# Patient Record
Sex: MALE | Race: WHITE | NOT HISPANIC OR LATINO
[De-identification: names, ages, dates, MRNs, and addresses within clinical notes are randomized per-mention and may not be internally consistent; named-entity substitution may affect disease eponyms.]

---

## 2018-07-25 PROBLEM — Z00.00 ENCOUNTER FOR PREVENTIVE HEALTH EXAMINATION: Status: ACTIVE | Noted: 2018-07-25

## 2018-07-30 ENCOUNTER — OTHER (OUTPATIENT)
Age: 58
End: 2018-07-30

## 2018-07-30 DIAGNOSIS — M25.562 PAIN IN RIGHT KNEE: ICD-10-CM

## 2018-07-30 DIAGNOSIS — M25.561 PAIN IN RIGHT KNEE: ICD-10-CM

## 2018-07-31 ENCOUNTER — APPOINTMENT (OUTPATIENT)
Dept: ORTHOPEDIC SURGERY | Facility: CLINIC | Age: 58
End: 2018-07-31
Payer: COMMERCIAL

## 2018-07-31 VITALS
BODY MASS INDEX: 39.4 KG/M2 | TEMPERATURE: 98.5 F | DIASTOLIC BLOOD PRESSURE: 78 MMHG | HEART RATE: 73 BPM | SYSTOLIC BLOOD PRESSURE: 120 MMHG | HEIGHT: 68 IN | WEIGHT: 260 LBS

## 2018-07-31 DIAGNOSIS — M17.12 UNILATERAL PRIMARY OSTEOARTHRITIS, LEFT KNEE: ICD-10-CM

## 2018-07-31 DIAGNOSIS — Z86.79 PERSONAL HISTORY OF OTHER DISEASES OF THE CIRCULATORY SYSTEM: ICD-10-CM

## 2018-07-31 DIAGNOSIS — Z78.9 OTHER SPECIFIED HEALTH STATUS: ICD-10-CM

## 2018-07-31 DIAGNOSIS — M17.11 UNILATERAL PRIMARY OSTEOARTHRITIS, RIGHT KNEE: ICD-10-CM

## 2018-07-31 DIAGNOSIS — Z80.1 FAMILY HISTORY OF MALIGNANT NEOPLASM OF TRACHEA, BRONCHUS AND LUNG: ICD-10-CM

## 2018-07-31 PROCEDURE — 73564 X-RAY EXAM KNEE 4 OR MORE: CPT | Mod: 50

## 2018-07-31 PROCEDURE — 99205 OFFICE O/P NEW HI 60 MIN: CPT

## 2018-07-31 RX ORDER — HYDROCHLOROTHIAZIDE 12.5 MG/1
TABLET ORAL
Refills: 0 | Status: ACTIVE | COMMUNITY

## 2018-07-31 RX ORDER — IRBESARTAN 300 MG/1
TABLET, FILM COATED ORAL
Refills: 0 | Status: ACTIVE | COMMUNITY

## 2018-08-29 ENCOUNTER — RX RENEWAL (OUTPATIENT)
Age: 58
End: 2018-08-29

## 2018-09-10 ENCOUNTER — OUTPATIENT (OUTPATIENT)
Dept: OUTPATIENT SERVICES | Facility: HOSPITAL | Age: 58
LOS: 1 days | End: 2018-09-10
Payer: COMMERCIAL

## 2018-09-10 VITALS
HEART RATE: 84 BPM | TEMPERATURE: 99 F | HEIGHT: 67 IN | WEIGHT: 273.37 LBS | SYSTOLIC BLOOD PRESSURE: 136 MMHG | RESPIRATION RATE: 16 BRPM | DIASTOLIC BLOOD PRESSURE: 94 MMHG

## 2018-09-10 DIAGNOSIS — Z01.818 ENCOUNTER FOR OTHER PREPROCEDURAL EXAMINATION: ICD-10-CM

## 2018-09-10 DIAGNOSIS — I10 ESSENTIAL (PRIMARY) HYPERTENSION: ICD-10-CM

## 2018-09-10 DIAGNOSIS — M17.0 BILATERAL PRIMARY OSTEOARTHRITIS OF KNEE: ICD-10-CM

## 2018-09-10 DIAGNOSIS — Z29.9 ENCOUNTER FOR PROPHYLACTIC MEASURES, UNSPECIFIED: ICD-10-CM

## 2018-09-10 DIAGNOSIS — Z98.890 OTHER SPECIFIED POSTPROCEDURAL STATES: Chronic | ICD-10-CM

## 2018-09-10 LAB
ANION GAP SERPL CALC-SCNC: 17 MMOL/L — SIGNIFICANT CHANGE UP (ref 5–17)
APTT BLD: 30.3 SEC — SIGNIFICANT CHANGE UP (ref 27.5–37.4)
BLD GP AB SCN SERPL QL: SIGNIFICANT CHANGE UP
BUN SERPL-MCNC: 17 MG/DL — SIGNIFICANT CHANGE UP (ref 8–20)
CALCIUM SERPL-MCNC: 9.2 MG/DL — SIGNIFICANT CHANGE UP (ref 8.6–10.2)
CHLORIDE SERPL-SCNC: 98 MMOL/L — SIGNIFICANT CHANGE UP (ref 98–107)
CO2 SERPL-SCNC: 26 MMOL/L — SIGNIFICANT CHANGE UP (ref 22–29)
CREAT SERPL-MCNC: 1.14 MG/DL — SIGNIFICANT CHANGE UP (ref 0.5–1.3)
GLUCOSE SERPL-MCNC: 104 MG/DL — SIGNIFICANT CHANGE UP (ref 70–115)
HCT VFR BLD CALC: 45.6 % — SIGNIFICANT CHANGE UP (ref 42–52)
HGB BLD-MCNC: 15.5 G/DL — SIGNIFICANT CHANGE UP (ref 14–18)
INR BLD: 1.07 RATIO — SIGNIFICANT CHANGE UP (ref 0.88–1.16)
MCHC RBC-ENTMCNC: 28.9 PG — SIGNIFICANT CHANGE UP (ref 27–31)
MCHC RBC-ENTMCNC: 34 G/DL — SIGNIFICANT CHANGE UP (ref 32–36)
MCV RBC AUTO: 85.1 FL — SIGNIFICANT CHANGE UP (ref 80–94)
MRSA PCR RESULT.: SIGNIFICANT CHANGE UP
PLATELET # BLD AUTO: 221 K/UL — SIGNIFICANT CHANGE UP (ref 150–400)
POTASSIUM SERPL-MCNC: 3.9 MMOL/L — SIGNIFICANT CHANGE UP (ref 3.5–5.3)
POTASSIUM SERPL-SCNC: 3.9 MMOL/L — SIGNIFICANT CHANGE UP (ref 3.5–5.3)
PROTHROM AB SERPL-ACNC: 11.8 SEC — SIGNIFICANT CHANGE UP (ref 9.8–12.7)
RBC # BLD: 5.36 M/UL — SIGNIFICANT CHANGE UP (ref 4.6–6.2)
RBC # FLD: 13.2 % — SIGNIFICANT CHANGE UP (ref 11–15.6)
S AUREUS DNA NOSE QL NAA+PROBE: DETECTED
SODIUM SERPL-SCNC: 141 MMOL/L — SIGNIFICANT CHANGE UP (ref 135–145)
TYPE + AB SCN PNL BLD: SIGNIFICANT CHANGE UP
WBC # BLD: 10.8 K/UL — SIGNIFICANT CHANGE UP (ref 4.8–10.8)
WBC # FLD AUTO: 10.8 K/UL — SIGNIFICANT CHANGE UP (ref 4.8–10.8)

## 2018-09-10 PROCEDURE — 93010 ELECTROCARDIOGRAM REPORT: CPT

## 2018-09-10 PROCEDURE — 85610 PROTHROMBIN TIME: CPT

## 2018-09-10 PROCEDURE — 80048 BASIC METABOLIC PNL TOTAL CA: CPT

## 2018-09-10 PROCEDURE — 87641 MR-STAPH DNA AMP PROBE: CPT

## 2018-09-10 PROCEDURE — 87640 STAPH A DNA AMP PROBE: CPT

## 2018-09-10 PROCEDURE — G0463: CPT

## 2018-09-10 PROCEDURE — 86900 BLOOD TYPING SEROLOGIC ABO: CPT

## 2018-09-10 PROCEDURE — 85027 COMPLETE CBC AUTOMATED: CPT

## 2018-09-10 PROCEDURE — 36415 COLL VENOUS BLD VENIPUNCTURE: CPT

## 2018-09-10 PROCEDURE — 86850 RBC ANTIBODY SCREEN: CPT

## 2018-09-10 PROCEDURE — 93005 ELECTROCARDIOGRAM TRACING: CPT

## 2018-09-10 PROCEDURE — 85730 THROMBOPLASTIN TIME PARTIAL: CPT

## 2018-09-10 PROCEDURE — 86901 BLOOD TYPING SEROLOGIC RH(D): CPT

## 2018-09-10 RX ORDER — SODIUM CHLORIDE 9 MG/ML
3 INJECTION INTRAMUSCULAR; INTRAVENOUS; SUBCUTANEOUS EVERY 8 HOURS
Qty: 0 | Refills: 0 | Status: DISCONTINUED | OUTPATIENT
Start: 2018-10-19 | End: 2018-10-22

## 2018-09-10 NOTE — H&P PST ADULT - LAB RESULTS AND INTERPRETATION
9-11-17; [facility] informed and gave detailed instructions about the treatment, E- scribed meds to [facility] pharmacy. Dr. Lott's office informed.

## 2018-09-10 NOTE — H&P PST ADULT - HISTORY OF PRESENT ILLNESS
57 year old male 57 year old male who states that he had B/L knee pain for the last 17 years which progressively worsened.

## 2018-09-10 NOTE — H&P PST ADULT - NSANTHOSAYNRD_GEN_A_CORE
No. BENJIE screening performed.  STOP BANG Legend: 0-2 = LOW Risk; 3-4 = INTERMEDIATE Risk; 5-8 = HIGH Risk

## 2018-09-10 NOTE — PATIENT PROFILE ADULT. - FAMILY HISTORY
Father  Still living? No  Family history of lung cancer, Age at diagnosis: Age Unknown     Mother  Still living? No  Family history of thyroid disease, Age at diagnosis: Age Unknown

## 2018-09-10 NOTE — H&P PST ADULT - PROBLEM SELECTOR PLAN 3
Caprini Score 7 High risk,  Surgical team should assess /Strongly recommend pharmacological and mechanical measures for VTE prophylaxis

## 2018-09-10 NOTE — PATIENT PROFILE ADULT. - VISION (WITH CORRECTIVE LENSES IF THE PATIENT USUALLY WEARS THEM):
Partially impaired: cannot see medication labels or newsprint, but can see obstacles in path, and the surrounding layout; can count fingers at arm's length/glasses - reading

## 2018-09-10 NOTE — H&P PST ADULT - ASSESSMENT
medications reviewed, instructions given on what medications to take and what not to take. Asked the patient to take the Blood pressure medication/ heart medication on DOP.   CAPRINI SCORE [CLOT]    AGE RELATED RISK FACTORS                                                       MOBILITY RELATED FACTORS  [ x] Age 41-60 years                                            (1 Point)                  [ ] Bed rest                                                        (1 Point)  [ ] Age: 61-74 years                                           (2 Points)                 [ ] Plaster cast                                                   (2 Points)  [ ] Age= 75 years                                              (3 Points)                 [ ] Bed bound for more than 72 hours                 (2 Points)    DISEASE RELATED RISK FACTORS                                               GENDER SPECIFIC FACTORS  [ ] Edema in the lower extremities                       (1 Point)                  [ ] Pregnancy                                                     (1 Point)  [ ] Varicose veins                                               (1 Point)                  [ ] Post-partum < 6 weeks                                   (1 Point)             [x ] BMI > 25 Kg/m2                                            (1 Point)                  [ ] Hormonal therapy  or oral contraception          (1 Point)                 [ ] Sepsis (in the previous month)                        (1 Point)                  [ ] History of pregnancy complications                 (1 point)  [ ] Pneumonia or serious lung disease                                               [ ] Unexplained or recurrent                     (1 Point)           (in the previous month)                               (1 Point)  [ ] Abnormal pulmonary function test                     (1 Point)                 SURGERY RELATED RISK FACTORS  [ ] Acute myocardial infarction                              (1 Point)                 [ ]  Section                                             (1 Point)  [ ] Congestive heart failure (in the previous month)  (1 Point)               [ ] Minor surgery                                                  (1 Point)   [ ] Inflammatory bowel disease                             (1 Point)                 [ ] Arthroscopic surgery                                        (2 Points)  [ ] Central venous access                                      (2 Points)                [ ] General surgery lasting more than 45 minutes   (2 Points)       [ ] Stroke (in the previous month)                          (5 Points)               [x ] Elective arthroplasty                                         (5 Points)                                                                                                                                               HEMATOLOGY RELATED FACTORS                                                 TRAUMA RELATED RISK FACTORS  [ ] Prior episodes of VTE                                     (3 Points)                 [ ] Fracture of the hip, pelvis, or leg                       (5 Points)  [ ] Positive family history for VTE                         (3 Points)                 [ ] Acute spinal cord injury (in the previous month)  (5 Points)  [ ] Prothrombin 18632 A                                     (3 Points)                 [ ] Paralysis  (less than 1 month)                             (5 Points)  [ ] Factor V Leiden                                             (3 Points)                  [ ] Multiple Trauma within 1 month                        (5 Points)  [ ] Lupus anticoagulants                                     (3 Points)                                                           [ ] Anticardiolipin antibodies                               (3 Points)                                                       [ ] High homocysteine in the blood                      (3 Points)                                             [ ] Other congenital or acquired thrombophilia      (3 Points)                                                [ ] Heparin induced thrombocytopenia                  (3 Points)                                          Total Score [    7      ]

## 2018-09-10 NOTE — PATIENT PROFILE ADULT. - LEARNING ASSESSMENT (PATIENT) ADDITIONAL COMMENTS
Instructed pt on pre-op instructions, tips for safer surgery, pain management scale, pre-surgical infection prevention, MRSA/MSSA, Medical clearance - Dr Mao, take Ibersartan & HCTZ with a sip of water the morning of surgery, Joint Replacement Class, verbalized understanding of all instructions given.

## 2018-09-11 RX ORDER — MUPIROCIN 20 MG/G
1 OINTMENT TOPICAL
Qty: 1 | Refills: 0 | OUTPATIENT
Start: 2018-09-11 | End: 2018-09-15

## 2018-09-20 PROBLEM — I10 ESSENTIAL (PRIMARY) HYPERTENSION: Chronic | Status: ACTIVE | Noted: 2018-09-10

## 2018-09-24 NOTE — H&P PST ADULT - CONSTITUTIONAL
Clinic hours for Dr. Hernanedz:  Monday 8am - 5pm  Tuesday 9am - 6pm  Wednesday 8am - 12pm  Thursday 8am - 5pm  Friday  7am - 4pm  3rd Saturday of the month 8-12pm  Not in the Wednesday before that Saturday    If you need a refill on your prescription please call your pharmacy and let them know. Please be proactive and call before your medication runs out. The pharmacy will then contact us for the refill. Please allow 24-48 hours for the refill to be processed.     If your physician has ordered additional laboratory or radiology testing as part of your ongoing plan of care, please allow 5-7 business days from the day of your lab draw or test for the results to be sent and reviewed by your provider. If your results are critical and require more immediate intervention, you will be contacted sooner. Your results will be conveyed to you via a phone call or letter.    You may be receiving a patient satisfaction survey in the mail. Please take the time to complete, as your feedback is very important to us. We strive to make your experience exceptional and your comments help us with that goal. We look forward to hearing from you.  
detailed exam

## 2018-09-27 ENCOUNTER — RX RENEWAL (OUTPATIENT)
Age: 58
End: 2018-09-27

## 2018-10-01 ENCOUNTER — OUTPATIENT (OUTPATIENT)
Dept: OUTPATIENT SERVICES | Facility: HOSPITAL | Age: 58
LOS: 1 days | End: 2018-10-01
Payer: COMMERCIAL

## 2018-10-01 VITALS
WEIGHT: 271.17 LBS | HEART RATE: 68 BPM | HEIGHT: 68 IN | DIASTOLIC BLOOD PRESSURE: 87 MMHG | TEMPERATURE: 97 F | RESPIRATION RATE: 16 BRPM | SYSTOLIC BLOOD PRESSURE: 132 MMHG

## 2018-10-01 DIAGNOSIS — Z98.890 OTHER SPECIFIED POSTPROCEDURAL STATES: Chronic | ICD-10-CM

## 2018-10-01 DIAGNOSIS — Z01.818 ENCOUNTER FOR OTHER PREPROCEDURAL EXAMINATION: ICD-10-CM

## 2018-10-01 LAB
ANION GAP SERPL CALC-SCNC: 13 MMOL/L — SIGNIFICANT CHANGE UP (ref 5–17)
APTT BLD: 28.9 SEC — SIGNIFICANT CHANGE UP (ref 27.5–37.4)
BASOPHILS # BLD AUTO: 0 K/UL — SIGNIFICANT CHANGE UP (ref 0–0.2)
BASOPHILS NFR BLD AUTO: 0.3 % — SIGNIFICANT CHANGE UP (ref 0–2)
BLD GP AB SCN SERPL QL: SIGNIFICANT CHANGE UP
BUN SERPL-MCNC: 20 MG/DL — SIGNIFICANT CHANGE UP (ref 8–20)
CALCIUM SERPL-MCNC: 9.3 MG/DL — SIGNIFICANT CHANGE UP (ref 8.6–10.2)
CHLORIDE SERPL-SCNC: 101 MMOL/L — SIGNIFICANT CHANGE UP (ref 98–107)
CO2 SERPL-SCNC: 25 MMOL/L — SIGNIFICANT CHANGE UP (ref 22–29)
CREAT SERPL-MCNC: 1.12 MG/DL — SIGNIFICANT CHANGE UP (ref 0.5–1.3)
EOSINOPHIL # BLD AUTO: 0.2 K/UL — SIGNIFICANT CHANGE UP (ref 0–0.5)
EOSINOPHIL NFR BLD AUTO: 2.6 % — SIGNIFICANT CHANGE UP (ref 0–5)
GLUCOSE SERPL-MCNC: 94 MG/DL — SIGNIFICANT CHANGE UP (ref 70–115)
HCT VFR BLD CALC: 47.6 % — SIGNIFICANT CHANGE UP (ref 42–52)
HGB BLD-MCNC: 16.1 G/DL — SIGNIFICANT CHANGE UP (ref 14–18)
INR BLD: 1.04 RATIO — SIGNIFICANT CHANGE UP (ref 0.88–1.16)
LYMPHOCYTES # BLD AUTO: 1.7 K/UL — SIGNIFICANT CHANGE UP (ref 1–4.8)
LYMPHOCYTES # BLD AUTO: 18.2 % — LOW (ref 20–55)
MCHC RBC-ENTMCNC: 28.3 PG — SIGNIFICANT CHANGE UP (ref 27–31)
MCHC RBC-ENTMCNC: 33.8 G/DL — SIGNIFICANT CHANGE UP (ref 32–36)
MCV RBC AUTO: 83.8 FL — SIGNIFICANT CHANGE UP (ref 80–94)
MONOCYTES # BLD AUTO: 0.6 K/UL — SIGNIFICANT CHANGE UP (ref 0–0.8)
MONOCYTES NFR BLD AUTO: 6.4 % — SIGNIFICANT CHANGE UP (ref 3–10)
MRSA PCR RESULT.: SIGNIFICANT CHANGE UP
NEUTROPHILS # BLD AUTO: 6.6 K/UL — SIGNIFICANT CHANGE UP (ref 1.8–8)
NEUTROPHILS NFR BLD AUTO: 72.3 % — SIGNIFICANT CHANGE UP (ref 37–73)
PLATELET # BLD AUTO: 183 K/UL — SIGNIFICANT CHANGE UP (ref 150–400)
POTASSIUM SERPL-MCNC: 3.7 MMOL/L — SIGNIFICANT CHANGE UP (ref 3.5–5.3)
POTASSIUM SERPL-SCNC: 3.7 MMOL/L — SIGNIFICANT CHANGE UP (ref 3.5–5.3)
PROTHROM AB SERPL-ACNC: 11.5 SEC — SIGNIFICANT CHANGE UP (ref 9.8–12.7)
RBC # BLD: 5.68 M/UL — SIGNIFICANT CHANGE UP (ref 4.6–6.2)
RBC # FLD: 13 % — SIGNIFICANT CHANGE UP (ref 11–15.6)
S AUREUS DNA NOSE QL NAA+PROBE: DETECTED
SODIUM SERPL-SCNC: 139 MMOL/L — SIGNIFICANT CHANGE UP (ref 135–145)
TYPE + AB SCN PNL BLD: SIGNIFICANT CHANGE UP
WBC # BLD: 9.2 K/UL — SIGNIFICANT CHANGE UP (ref 4.8–10.8)
WBC # FLD AUTO: 9.2 K/UL — SIGNIFICANT CHANGE UP (ref 4.8–10.8)

## 2018-10-01 PROCEDURE — 86900 BLOOD TYPING SEROLOGIC ABO: CPT

## 2018-10-01 PROCEDURE — 86850 RBC ANTIBODY SCREEN: CPT

## 2018-10-01 PROCEDURE — 85730 THROMBOPLASTIN TIME PARTIAL: CPT

## 2018-10-01 PROCEDURE — 36415 COLL VENOUS BLD VENIPUNCTURE: CPT

## 2018-10-01 PROCEDURE — 87640 STAPH A DNA AMP PROBE: CPT

## 2018-10-01 PROCEDURE — 80048 BASIC METABOLIC PNL TOTAL CA: CPT

## 2018-10-01 PROCEDURE — 85027 COMPLETE CBC AUTOMATED: CPT

## 2018-10-01 PROCEDURE — G0463: CPT

## 2018-10-01 PROCEDURE — 85610 PROTHROMBIN TIME: CPT

## 2018-10-01 PROCEDURE — 86901 BLOOD TYPING SEROLOGIC RH(D): CPT

## 2018-10-01 RX ORDER — MUPIROCIN 20 MG/G
1 OINTMENT TOPICAL
Qty: 1 | Refills: 0 | OUTPATIENT
Start: 2018-10-01 | End: 2018-10-05

## 2018-10-01 RX ORDER — INFLUENZA VIRUS VACCINE 15; 15; 15; 15 UG/.5ML; UG/.5ML; UG/.5ML; UG/.5ML
0.5 SUSPENSION INTRAMUSCULAR ONCE
Qty: 0 | Refills: 0 | Status: DISCONTINUED | OUTPATIENT
Start: 2018-10-01 | End: 2018-10-16

## 2018-10-01 RX ORDER — SODIUM CHLORIDE 9 MG/ML
3 INJECTION INTRAMUSCULAR; INTRAVENOUS; SUBCUTANEOUS EVERY 8 HOURS
Qty: 0 | Refills: 0 | Status: DISCONTINUED | OUTPATIENT
Start: 2018-10-19 | End: 2018-10-22

## 2018-10-01 NOTE — H&P PST ADULT - HISTORY OF PRESENT ILLNESS
58 year old male present with c/o b/l knee pain 58 year old male present with c/o b/l knee pain x 17 years.   Patient is now schedule for b/l total knee replacements.

## 2018-10-01 NOTE — H&P PST ADULT - PROBLEM SELECTOR PLAN 2
Medical Clearance pending    continue medications as instructed. Asked the patient to take the Blood pressure medication/ heart medication on DOP.

## 2018-10-01 NOTE — H&P PST ADULT - MUSCULOSKELETAL
details… detailed exam normal strength/decreased ROM due to pain/no calf tenderness/no joint erythema/no joint warmth

## 2018-10-12 RX ORDER — TRANEXAMIC ACID 100 MG/ML
1250 INJECTION, SOLUTION INTRAVENOUS ONCE
Qty: 0 | Refills: 0 | Status: DISCONTINUED | OUTPATIENT
Start: 2018-10-19 | End: 2018-10-22

## 2018-10-18 ENCOUNTER — TRANSCRIPTION ENCOUNTER (OUTPATIENT)
Age: 58
End: 2018-10-18

## 2018-10-19 ENCOUNTER — TRANSCRIPTION ENCOUNTER (OUTPATIENT)
Age: 58
End: 2018-10-19

## 2018-10-19 ENCOUNTER — APPOINTMENT (OUTPATIENT)
Dept: ORTHOPEDIC SURGERY | Facility: HOSPITAL | Age: 58
End: 2018-10-19

## 2018-10-19 ENCOUNTER — INPATIENT (INPATIENT)
Facility: HOSPITAL | Age: 58
LOS: 2 days | Discharge: HOME | DRG: 462 | End: 2018-10-22
Attending: ORTHOPAEDIC SURGERY | Admitting: ORTHOPAEDIC SURGERY
Payer: COMMERCIAL

## 2018-10-19 VITALS
RESPIRATION RATE: 16 BRPM | DIASTOLIC BLOOD PRESSURE: 79 MMHG | HEART RATE: 92 BPM | OXYGEN SATURATION: 95 % | SYSTOLIC BLOOD PRESSURE: 152 MMHG | TEMPERATURE: 98 F | WEIGHT: 273.37 LBS | HEIGHT: 67 IN

## 2018-10-19 DIAGNOSIS — M17.0 BILATERAL PRIMARY OSTEOARTHRITIS OF KNEE: ICD-10-CM

## 2018-10-19 DIAGNOSIS — Z98.890 OTHER SPECIFIED POSTPROCEDURAL STATES: Chronic | ICD-10-CM

## 2018-10-19 LAB
GLUCOSE BLDC GLUCOMTR-MCNC: 105 MG/DL — HIGH (ref 70–99)
GLUCOSE BLDC GLUCOMTR-MCNC: 107 MG/DL — HIGH (ref 70–99)
GLUCOSE BLDC GLUCOMTR-MCNC: 135 MG/DL — HIGH (ref 70–99)

## 2018-10-19 PROCEDURE — 27447 TOTAL KNEE ARTHROPLASTY: CPT | Mod: AS,RT,LT

## 2018-10-19 PROCEDURE — 20985 CPTR-ASST DIR MS PX: CPT

## 2018-10-19 PROCEDURE — 27447 TOTAL KNEE ARTHROPLASTY: CPT | Mod: RT

## 2018-10-19 PROCEDURE — 73560 X-RAY EXAM OF KNEE 1 OR 2: CPT | Mod: 26,50

## 2018-10-19 RX ORDER — ACETAMINOPHEN 500 MG
1000 TABLET ORAL ONCE
Qty: 0 | Refills: 0 | Status: COMPLETED | OUTPATIENT
Start: 2018-10-19 | End: 2018-10-19

## 2018-10-19 RX ORDER — ONDANSETRON 8 MG/1
4 TABLET, FILM COATED ORAL ONCE
Qty: 0 | Refills: 0 | Status: DISCONTINUED | OUTPATIENT
Start: 2018-10-19 | End: 2018-10-19

## 2018-10-19 RX ORDER — VANCOMYCIN HCL 1 G
1500 VIAL (EA) INTRAVENOUS ONCE
Qty: 0 | Refills: 0 | Status: COMPLETED | OUTPATIENT
Start: 2018-10-19 | End: 2018-10-19

## 2018-10-19 RX ORDER — HYDROMORPHONE HYDROCHLORIDE 2 MG/ML
2 INJECTION INTRAMUSCULAR; INTRAVENOUS; SUBCUTANEOUS
Qty: 0 | Refills: 0 | Status: DISCONTINUED | OUTPATIENT
Start: 2018-10-19 | End: 2018-10-22

## 2018-10-19 RX ORDER — LOSARTAN POTASSIUM 100 MG/1
100 TABLET, FILM COATED ORAL DAILY
Qty: 0 | Refills: 0 | Status: DISCONTINUED | OUTPATIENT
Start: 2018-10-20 | End: 2018-10-22

## 2018-10-19 RX ORDER — POLYETHYLENE GLYCOL 3350 17 G/17G
17 POWDER, FOR SOLUTION ORAL DAILY
Qty: 0 | Refills: 0 | Status: DISCONTINUED | OUTPATIENT
Start: 2018-10-19 | End: 2018-10-22

## 2018-10-19 RX ORDER — HYDROMORPHONE HYDROCHLORIDE 2 MG/ML
0.5 INJECTION INTRAMUSCULAR; INTRAVENOUS; SUBCUTANEOUS
Qty: 0 | Refills: 0 | Status: DISCONTINUED | OUTPATIENT
Start: 2018-10-19 | End: 2018-10-22

## 2018-10-19 RX ORDER — CELECOXIB 200 MG/1
400 CAPSULE ORAL ONCE
Qty: 0 | Refills: 0 | Status: COMPLETED | OUTPATIENT
Start: 2018-10-19 | End: 2018-10-19

## 2018-10-19 RX ORDER — ACETAMINOPHEN 500 MG
975 TABLET ORAL EVERY 8 HOURS
Qty: 0 | Refills: 0 | Status: DISCONTINUED | OUTPATIENT
Start: 2018-10-19 | End: 2018-10-22

## 2018-10-19 RX ORDER — SODIUM CHLORIDE 9 MG/ML
1000 INJECTION, SOLUTION INTRAVENOUS
Qty: 0 | Refills: 0 | Status: DISCONTINUED | OUTPATIENT
Start: 2018-10-19 | End: 2018-10-19

## 2018-10-19 RX ORDER — OXYCODONE HYDROCHLORIDE 5 MG/1
5 TABLET ORAL
Qty: 0 | Refills: 0 | Status: DISCONTINUED | OUTPATIENT
Start: 2018-10-19 | End: 2018-10-22

## 2018-10-19 RX ORDER — HYDROCHLOROTHIAZIDE 25 MG
25 TABLET ORAL DAILY
Qty: 0 | Refills: 0 | Status: DISCONTINUED | OUTPATIENT
Start: 2018-10-20 | End: 2018-10-22

## 2018-10-19 RX ORDER — ACETAMINOPHEN 500 MG
1000 TABLET ORAL ONCE
Qty: 0 | Refills: 0 | Status: COMPLETED | OUTPATIENT
Start: 2018-10-19 | End: 2018-10-22

## 2018-10-19 RX ORDER — MAGNESIUM HYDROXIDE 400 MG/1
30 TABLET, CHEWABLE ORAL DAILY
Qty: 0 | Refills: 0 | Status: DISCONTINUED | OUTPATIENT
Start: 2018-10-19 | End: 2018-10-22

## 2018-10-19 RX ORDER — SODIUM CHLORIDE 9 MG/ML
1000 INJECTION, SOLUTION INTRAVENOUS
Qty: 0 | Refills: 0 | Status: DISCONTINUED | OUTPATIENT
Start: 2018-10-19 | End: 2018-10-21

## 2018-10-19 RX ORDER — ENOXAPARIN SODIUM 100 MG/ML
30 INJECTION SUBCUTANEOUS EVERY 12 HOURS
Qty: 0 | Refills: 0 | Status: DISCONTINUED | OUTPATIENT
Start: 2018-10-20 | End: 2018-10-22

## 2018-10-19 RX ORDER — GABAPENTIN 400 MG/1
300 CAPSULE ORAL ONCE
Qty: 0 | Refills: 0 | Status: COMPLETED | OUTPATIENT
Start: 2018-10-19 | End: 2018-10-19

## 2018-10-19 RX ORDER — SENNA PLUS 8.6 MG/1
2 TABLET ORAL AT BEDTIME
Qty: 0 | Refills: 0 | Status: DISCONTINUED | OUTPATIENT
Start: 2018-10-19 | End: 2018-10-22

## 2018-10-19 RX ORDER — CEFAZOLIN SODIUM 1 G
3000 VIAL (EA) INJECTION
Qty: 0 | Refills: 0 | Status: COMPLETED | OUTPATIENT
Start: 2018-10-19 | End: 2018-10-20

## 2018-10-19 RX ORDER — CEPHALEXIN 500 MG
500 CAPSULE ORAL
Qty: 0 | Refills: 0 | Status: DISCONTINUED | OUTPATIENT
Start: 2018-10-20 | End: 2018-10-22

## 2018-10-19 RX ORDER — OXYCODONE HYDROCHLORIDE 5 MG/1
20 TABLET ORAL ONCE
Qty: 0 | Refills: 0 | Status: DISCONTINUED | OUTPATIENT
Start: 2018-10-19 | End: 2018-10-19

## 2018-10-19 RX ORDER — MORPHINE SULFATE 50 MG/1
4 CAPSULE, EXTENDED RELEASE ORAL
Qty: 0 | Refills: 0 | Status: DISCONTINUED | OUTPATIENT
Start: 2018-10-19 | End: 2018-10-19

## 2018-10-19 RX ORDER — SCOPALAMINE 1 MG/3D
1 PATCH, EXTENDED RELEASE TRANSDERMAL ONCE
Qty: 0 | Refills: 0 | Status: COMPLETED | OUTPATIENT
Start: 2018-10-19 | End: 2018-10-19

## 2018-10-19 RX ORDER — OXYCODONE HYDROCHLORIDE 5 MG/1
10 TABLET ORAL
Qty: 0 | Refills: 0 | Status: DISCONTINUED | OUTPATIENT
Start: 2018-10-19 | End: 2018-10-22

## 2018-10-19 RX ORDER — KETOROLAC TROMETHAMINE 30 MG/ML
15 SYRINGE (ML) INJECTION ONCE
Qty: 0 | Refills: 0 | Status: DISCONTINUED | OUTPATIENT
Start: 2018-10-19 | End: 2018-10-21

## 2018-10-19 RX ORDER — IRBESARTAN 75 MG/1
1 TABLET ORAL
Qty: 0 | Refills: 0 | COMMUNITY

## 2018-10-19 RX ORDER — ONDANSETRON 8 MG/1
4 TABLET, FILM COATED ORAL EVERY 6 HOURS
Qty: 0 | Refills: 0 | Status: DISCONTINUED | OUTPATIENT
Start: 2018-10-19 | End: 2018-10-22

## 2018-10-19 RX ORDER — CEFAZOLIN SODIUM 1 G
3000 VIAL (EA) INJECTION ONCE
Qty: 0 | Refills: 0 | Status: COMPLETED | OUTPATIENT
Start: 2018-10-19 | End: 2018-10-19

## 2018-10-19 RX ORDER — VANCOMYCIN HCL 1 G
1500 VIAL (EA) INTRAVENOUS
Qty: 0 | Refills: 0 | Status: COMPLETED | OUTPATIENT
Start: 2018-10-19 | End: 2018-10-20

## 2018-10-19 RX ORDER — DOCUSATE SODIUM 100 MG
100 CAPSULE ORAL THREE TIMES A DAY
Qty: 0 | Refills: 0 | Status: DISCONTINUED | OUTPATIENT
Start: 2018-10-19 | End: 2018-10-22

## 2018-10-19 RX ORDER — KETOROLAC TROMETHAMINE 30 MG/ML
15 SYRINGE (ML) INJECTION ONCE
Qty: 0 | Refills: 0 | Status: DISCONTINUED | OUTPATIENT
Start: 2018-10-19 | End: 2018-10-20

## 2018-10-19 RX ADMIN — SCOPALAMINE 1 PATCH: 1 PATCH, EXTENDED RELEASE TRANSDERMAL at 11:21

## 2018-10-19 RX ADMIN — Medication 400 MILLIGRAM(S): at 15:00

## 2018-10-19 RX ADMIN — CELECOXIB 400 MILLIGRAM(S): 200 CAPSULE ORAL at 11:20

## 2018-10-19 RX ADMIN — Medication 250 MILLIGRAM(S): at 12:01

## 2018-10-19 RX ADMIN — Medication 1000 MILLIGRAM(S): at 21:42

## 2018-10-19 RX ADMIN — OXYCODONE HYDROCHLORIDE 20 MILLIGRAM(S): 5 TABLET ORAL at 11:21

## 2018-10-19 RX ADMIN — SODIUM CHLORIDE 3 MILLILITER(S): 9 INJECTION INTRAMUSCULAR; INTRAVENOUS; SUBCUTANEOUS at 21:28

## 2018-10-19 RX ADMIN — Medication 200 MILLIGRAM(S): at 22:06

## 2018-10-19 RX ADMIN — Medication 200 MILLIGRAM(S): at 14:10

## 2018-10-19 RX ADMIN — MORPHINE SULFATE 4 MILLIGRAM(S): 50 CAPSULE, EXTENDED RELEASE ORAL at 20:00

## 2018-10-19 RX ADMIN — MORPHINE SULFATE 4 MILLIGRAM(S): 50 CAPSULE, EXTENDED RELEASE ORAL at 21:27

## 2018-10-19 RX ADMIN — GABAPENTIN 300 MILLIGRAM(S): 400 CAPSULE ORAL at 11:20

## 2018-10-19 NOTE — DISCHARGE NOTE ADULT - HOSPITAL COURSE
The patient underwent a BILATERAL TOTAL KNEE REPLACEMENT on 10/19/18. The patient received antibiotics consistent with SCIP guidelines. The patient underwent the procedure and had no intra-operative complications. Post-operatively, the patient was seen by medicine and PT. The patient received LOVENOX for DVTP. The patient received pain medications per orthopedic pain managment protocol and the pain was appropriately controlled. The patient did not have any post-operative medical complications. The patient was discharged in stable condition.

## 2018-10-19 NOTE — DISCHARGE NOTE ADULT - PATIENT PORTAL LINK FT
You can access the [facility] Patient Portal, offered by [facility], by registering with the following website: [email or web address]

## 2018-10-19 NOTE — DISCHARGE NOTE ADULT - NS AS ACTIVITY OBS
No Heavy lifting/straining/Do not drive or operate machinery/Do not make important decisions No Heavy lifting/straining/Do not drive or operate machinery/Walking-Outdoors allowed/Stairs allowed/once cleared by PT/Do not make important decisions/Walking-Indoors allowed

## 2018-10-19 NOTE — DISCHARGE NOTE ADULT - CARE PROVIDER_API CALL
Ab Lott (MD), Orthopaedic Surgery  [address]  [place] [address]  Phone: [phone]  Fax: [phone]

## 2018-10-19 NOTE — DISCHARGE NOTE ADULT - CARE PLAN
Goal:	resume ADLs  Assessment and plan of treatment:	pain control, physical therapy Principal Discharge DX:	Primary osteoarthritis of both knees  Goal:	resume ADLs  Assessment and plan of treatment:	pain control, physical therapy

## 2018-10-19 NOTE — BRIEF OPERATIVE NOTE - PROCEDURE
<<-----Click on this checkbox to enter Procedure Total knee arthroplasty  10/19/2018  Bilateral computer assisted TKR  Active  BASIM

## 2018-10-19 NOTE — DISCHARGE NOTE ADULT - MEDICATION SUMMARY - MEDICATIONS TO TAKE
I will START or STAY ON the medications listed below when I get home from the hospital:    celecoxib 200 mg oral capsule  -- 1 cap(s) by mouth 2 times a day  -- Indication: For Pain med    HYDROmorphone 2 mg oral tablet  -- 1-2  tab(s) by mouth every 4 to 6 hours, As Needed  Pain MDD:8  -- Indication: For Pain med    irbesartan 300 mg oral tablet  -- 1 tab(s) by mouth once a day  -- Indication: For Home med    Lovenox 30 mg/0.3 mL injectable solution  -- 30 milligram(s) subcutaneously every 12 hours   -- It is very important that you take or use this exactly as directed.  Do not skip doses or discontinue unless directed by your doctor.    -- Indication: For DVT ppx    cefadroxil 500 mg oral capsule  -- 1 cap(s) by mouth 2 times a day   -- Finish all this medication unless otherwise directed by prescriber.    -- Indication: For infection ppx    hydroCHLOROthiazide 25 mg oral tablet  -- 1 tab(s) by mouth once a day  -- Indication: For Home med    Senna S 50 mg-8.6 mg oral tablet  -- 1 tab(s) by mouth 2 times a day   -- Medication should be taken with plenty of water.    -- Indication: For Stool softner

## 2018-10-19 NOTE — DISCHARGE NOTE ADULT - ADDITIONAL INSTRUCTIONS
The patient will be seen in the office in 2 weeks for wound check. Sutures/Staples/Tape will be removed at that time. Patient may shower after post-op day #3. The dressing is to be removed on 7. IF THE DRESSING BECOMES SOILED BEFORE THE REMOVAL DATE, CHANGE WITH A SIMILAR DRESSING. IF THE DRESSING BECOMES STAINED WITH DISCHARGE, CONTACT THE OFFICE FOR FURTHER DIRECTIONS. The patient will contact the office if the wound becomes red, has increasing pain, develops bleeding or discharge, an injury occurs, or has other concerns. The patient will continue PT consistent with total knee replacement. The patient will continue LOVENOX for 2 weeks and then begin ASPIRIN 325mg TWICE daily for 4 weeks for blood clot prevention. Patient will take Duricef 500mg twice a day to be completed on 10/26 for infection prevention. The patient will take OXYCODONE AND TYLENOL for pain control and titrate according to prescription and patient needs. The patient will take SENNA while taking oxycodone to prevent narcotic associated constipation.  Additionally, increase water intake (drink at least 8 glasses of water daily) and try adding fiber to the diet by eating fruits, vegetables and foods that are rich in grains. If constipation is experienced, contact the medical/primary care provider to discuss further treatment options. The patient is FULL weight bearing. Elevation of the lower leg is recommended to reduce swelling.

## 2018-10-20 DIAGNOSIS — I10 ESSENTIAL (PRIMARY) HYPERTENSION: ICD-10-CM

## 2018-10-20 DIAGNOSIS — Z00.00 ENCOUNTER FOR GENERAL ADULT MEDICAL EXAMINATION WITHOUT ABNORMAL FINDINGS: ICD-10-CM

## 2018-10-20 DIAGNOSIS — D62 ACUTE POSTHEMORRHAGIC ANEMIA: ICD-10-CM

## 2018-10-20 DIAGNOSIS — M17.0 BILATERAL PRIMARY OSTEOARTHRITIS OF KNEE: ICD-10-CM

## 2018-10-20 DIAGNOSIS — D72.829 ELEVATED WHITE BLOOD CELL COUNT, UNSPECIFIED: ICD-10-CM

## 2018-10-20 DIAGNOSIS — Z96.653 PRESENCE OF ARTIFICIAL KNEE JOINT, BILATERAL: ICD-10-CM

## 2018-10-20 LAB
ANION GAP SERPL CALC-SCNC: 15 MMOL/L — SIGNIFICANT CHANGE UP (ref 5–17)
BUN SERPL-MCNC: 17 MG/DL — SIGNIFICANT CHANGE UP (ref 8–20)
CALCIUM SERPL-MCNC: 8.3 MG/DL — LOW (ref 8.6–10.2)
CHLORIDE SERPL-SCNC: 97 MMOL/L — LOW (ref 98–107)
CO2 SERPL-SCNC: 24 MMOL/L — SIGNIFICANT CHANGE UP (ref 22–29)
CREAT SERPL-MCNC: 1.2 MG/DL — SIGNIFICANT CHANGE UP (ref 0.5–1.3)
GLUCOSE SERPL-MCNC: 133 MG/DL — HIGH (ref 70–115)
HCT VFR BLD CALC: 38.4 % — LOW (ref 42–52)
HGB BLD-MCNC: 13.2 G/DL — LOW (ref 14–18)
MCHC RBC-ENTMCNC: 28 PG — SIGNIFICANT CHANGE UP (ref 27–31)
MCHC RBC-ENTMCNC: 34.4 G/DL — SIGNIFICANT CHANGE UP (ref 32–36)
MCV RBC AUTO: 81.5 FL — SIGNIFICANT CHANGE UP (ref 80–94)
PLATELET # BLD AUTO: 199 K/UL — SIGNIFICANT CHANGE UP (ref 150–400)
POTASSIUM SERPL-MCNC: 3.3 MMOL/L — LOW (ref 3.5–5.3)
POTASSIUM SERPL-SCNC: 3.3 MMOL/L — LOW (ref 3.5–5.3)
RBC # BLD: 4.71 M/UL — SIGNIFICANT CHANGE UP (ref 4.6–6.2)
RBC # FLD: 13 % — SIGNIFICANT CHANGE UP (ref 11–15.6)
SODIUM SERPL-SCNC: 136 MMOL/L — SIGNIFICANT CHANGE UP (ref 135–145)
WBC # BLD: 18.6 K/UL — HIGH (ref 4.8–10.8)
WBC # FLD AUTO: 18.6 K/UL — HIGH (ref 4.8–10.8)

## 2018-10-20 PROCEDURE — 99222 1ST HOSP IP/OBS MODERATE 55: CPT

## 2018-10-20 RX ORDER — POTASSIUM CHLORIDE 20 MEQ
20 PACKET (EA) ORAL
Qty: 0 | Refills: 0 | Status: COMPLETED | OUTPATIENT
Start: 2018-10-20 | End: 2018-10-20

## 2018-10-20 RX ADMIN — HYDROMORPHONE HYDROCHLORIDE 0.5 MILLIGRAM(S): 2 INJECTION INTRAMUSCULAR; INTRAVENOUS; SUBCUTANEOUS at 20:22

## 2018-10-20 RX ADMIN — Medication 500 MILLIGRAM(S): at 17:21

## 2018-10-20 RX ADMIN — Medication 20 MILLIEQUIVALENT(S): at 17:21

## 2018-10-20 RX ADMIN — SODIUM CHLORIDE 3 MILLILITER(S): 9 INJECTION INTRAMUSCULAR; INTRAVENOUS; SUBCUTANEOUS at 02:49

## 2018-10-20 RX ADMIN — Medication 500 MILLIGRAM(S): at 06:18

## 2018-10-20 RX ADMIN — Medication 200 MILLIGRAM(S): at 06:15

## 2018-10-20 RX ADMIN — Medication 500 MILLIGRAM(S): at 23:36

## 2018-10-20 RX ADMIN — Medication 500 MILLIGRAM(S): at 11:30

## 2018-10-20 RX ADMIN — SODIUM CHLORIDE 3 MILLILITER(S): 9 INJECTION INTRAMUSCULAR; INTRAVENOUS; SUBCUTANEOUS at 06:29

## 2018-10-20 RX ADMIN — ENOXAPARIN SODIUM 30 MILLIGRAM(S): 100 INJECTION SUBCUTANEOUS at 06:17

## 2018-10-20 RX ADMIN — OXYCODONE HYDROCHLORIDE 10 MILLIGRAM(S): 5 TABLET ORAL at 20:00

## 2018-10-20 RX ADMIN — Medication 15 MILLIGRAM(S): at 22:39

## 2018-10-20 RX ADMIN — Medication 975 MILLIGRAM(S): at 00:44

## 2018-10-20 RX ADMIN — HYDROMORPHONE HYDROCHLORIDE 0.5 MILLIGRAM(S): 2 INJECTION INTRAMUSCULAR; INTRAVENOUS; SUBCUTANEOUS at 15:40

## 2018-10-20 RX ADMIN — Medication 975 MILLIGRAM(S): at 06:17

## 2018-10-20 RX ADMIN — SODIUM CHLORIDE 3 MILLILITER(S): 9 INJECTION INTRAMUSCULAR; INTRAVENOUS; SUBCUTANEOUS at 14:47

## 2018-10-20 RX ADMIN — Medication 300 MILLIGRAM(S): at 02:49

## 2018-10-20 RX ADMIN — Medication 100 MILLIGRAM(S): at 21:36

## 2018-10-20 RX ADMIN — Medication 975 MILLIGRAM(S): at 22:00

## 2018-10-20 RX ADMIN — Medication 20 MILLIEQUIVALENT(S): at 21:35

## 2018-10-20 RX ADMIN — HYDROMORPHONE HYDROCHLORIDE 0.5 MILLIGRAM(S): 2 INJECTION INTRAMUSCULAR; INTRAVENOUS; SUBCUTANEOUS at 21:00

## 2018-10-20 RX ADMIN — OXYCODONE HYDROCHLORIDE 5 MILLIGRAM(S): 5 TABLET ORAL at 13:23

## 2018-10-20 RX ADMIN — Medication 100 MILLIGRAM(S): at 00:44

## 2018-10-20 RX ADMIN — Medication 500 MILLIGRAM(S): at 01:04

## 2018-10-20 RX ADMIN — Medication 975 MILLIGRAM(S): at 21:35

## 2018-10-20 RX ADMIN — Medication 100 MILLIGRAM(S): at 06:30

## 2018-10-20 RX ADMIN — SODIUM CHLORIDE 3 MILLILITER(S): 9 INJECTION INTRAMUSCULAR; INTRAVENOUS; SUBCUTANEOUS at 21:30

## 2018-10-20 RX ADMIN — OXYCODONE HYDROCHLORIDE 10 MILLIGRAM(S): 5 TABLET ORAL at 18:52

## 2018-10-20 RX ADMIN — Medication 975 MILLIGRAM(S): at 13:18

## 2018-10-20 RX ADMIN — ENOXAPARIN SODIUM 30 MILLIGRAM(S): 100 INJECTION SUBCUTANEOUS at 17:21

## 2018-10-20 RX ADMIN — Medication 15 MILLIGRAM(S): at 21:48

## 2018-10-20 NOTE — CONSULT NOTE ADULT - SUBJECTIVE AND OBJECTIVE BOX
PMD :  Cardio :     Patient is a 58y old  Male who presents with a chief complaint of B/L total knee arthroplasty (19 Oct 2018 23:00)      HPI:  57 year old male who states that he had B/L knee pain for the last 17 years which progressively worsened. (10 Sep 2018 14:35)      PAST MEDICAL & SURGICAL HISTORY:  Obesity  Hypertension  H/O eye surgery      Social History:  Tabacco -   ETOH -   Illicit drug abuse - denies    FAMILY HISTORY:  Family history of thyroid disease (Mother)  Family history of lung cancer (Father)      Allergies    penicillin (Hives; Rash)    Intolerances        HOME MEDICATIONS :     REVIEW OF SYSTEMS:    CONSTITUTIONAL: No fever, weight loss, or fatigue  EYES: No eye pain, visual disturbances, or discharge  NECK: No pain or stiffness  RESPIRATORY: No cough, wheezing, chills or hemoptysis; No shortness of breath  CARDIOVASCULAR: No chest pain, palpitations, dizziness, or leg swelling  GASTROINTESTINAL: No abdominal or epigastric pain. No nausea, vomiting, or hematemesis; No diarrhea or constipation. No melena or hematochezia.  GENITOURINARY: No dysuria, frequency, hematuria, or incontinence  NEUROLOGICAL: No headaches, memory loss, loss of strength, numbness, or tremors  SKIN: No itching, burning, rashes, or lesions   LYMPH NODES: No enlarged glands  ENDOCRINE: No heat or cold intolerance; No hair loss  MUSCULOSKELETAL:   PSYCHIATRIC: No depression, anxiety, mood swings, or difficulty sleeping  HEME/LYMPH: No easy bruising, or bleeding gums  ALLERGY AND IMMUNOLOGIC: No hives or eczema    MEDICATIONS  (STANDING):  acetaminophen   Tablet .. 975 milliGRAM(s) Oral every 8 hours  acetaminophen  IVPB .. 1000 milliGRAM(s) IV Intermittent once  cephalexin 500 milliGRAM(s) Oral four times a day  docusate sodium 100 milliGRAM(s) Oral three times a day  enoxaparin Injectable 30 milliGRAM(s) SubCutaneous every 12 hours  hydrochlorothiazide 25 milliGRAM(s) Oral daily  lactated ringers. 1000 milliLiter(s) (150 mL/Hr) IV Continuous <Continuous>  losartan 100 milliGRAM(s) Oral daily  polyethylene glycol 3350 17 Gram(s) Oral daily  sodium chloride 0.9% lock flush 3 milliLiter(s) IV Push every 8 hours  sodium chloride 0.9% lock flush 3 milliLiter(s) IV Push every 8 hours  tranexamic acid IVPB 1250 milliGRAM(s) IV Intermittent once  tranexamic acid IVPB 1250 milliGRAM(s) IV Intermittent once    MEDICATIONS  (PRN):  aluminum hydroxide/magnesium hydroxide/simethicone Suspension 30 milliLiter(s) Oral four times a day PRN Indigestion  HYDROmorphone   Tablet 2 milliGRAM(s) Oral every 3 hours PRN Severe Pain (7 - 10)  HYDROmorphone  Injectable 0.5 milliGRAM(s) IV Push every 3 hours PRN Breakthrough Pain  ketorolac   Injectable 15 milliGRAM(s) IV Push once PRN Severe Pain (7 - 10)  ketorolac   Injectable 15 milliGRAM(s) IV Push once PRN Severe Pain (7 - 10)  magnesium hydroxide Suspension 30 milliLiter(s) Oral daily PRN Constipation  ondansetron Injectable 4 milliGRAM(s) IV Push every 6 hours PRN Nausea and/or Vomiting  oxyCODONE    IR 5 milliGRAM(s) Oral every 3 hours PRN Mild Pain (1 - 3)  oxyCODONE    IR 10 milliGRAM(s) Oral every 3 hours PRN Moderate Pain (4 - 6)  senna 2 Tablet(s) Oral at bedtime PRN Constipation      Vital Signs Last 24 Hrs  T(C): 36.8 (20 Oct 2018 08:35), Max: 37.6 (19 Oct 2018 19:12)  T(F): 98.3 (20 Oct 2018 08:35), Max: 99.7 (19 Oct 2018 19:12)  HR: 76 (20 Oct 2018 12:48) (61 - 101)  BP: 125/80 (20 Oct 2018 12:48) (109/47 - 160/82)  BP(mean): --  RR: 18 (20 Oct 2018 08:35) (14 - 25)  SpO2: 96% (20 Oct 2018 08:35) (94% - 99%)    PHYSICAL EXAM:    GENERAL: NAD, well-groomed, well-developed  HEAD:  Atraumatic, Normocephalic  EYES: EOMI, PERRLA, conjunctiva and sclera clear  NECK: Supple, No JVD, Normal thyroid  NERVOUS SYSTEM:  Alert & Oriented X3, Good concentration; Motor Strength 5/5 B/L upper and lower extremities; DTRs 2+ intact and symmetric  CHEST/LUNG: CTA  b/l,  no rales, rhonchi, wheezing, or rubs  HEART: Regular rate and rhythm; No murmurs, rubs, or gallops  ABDOMEN: Soft, Nontender, Nondistended; Bowel sounds present  EXTREMITIES:  2+ Peripheral Pulses, No clubbing, cyanosis, or edema ,   LYMPH: No lymphadenopathy noted  SKIN: No rashes or lesions    LABS:                        13.2   18.6  )-----------( 199      ( 20 Oct 2018 07:37 )             38.4     10-20    136  |  97<L>  |  17.0  ----------------------------<  133<H>  3.3<L>   |  24.0  |  1.20    Ca    8.3<L>      20 Oct 2018 07:37              RADIOLOGY & ADDITIONAL STUDIES: PMD : Cherelle   Cardio :     Patient is a 58y old  Male who is s/p B/L TKA , POD # 0 , doing well pain well controlled , no other complaints     CC: b/l       HPI:  57 year old male who states that he had B/L knee pain for the last 17 years which progressively worsened. (10 Sep 2018 14:35)      PAST MEDICAL & SURGICAL HISTORY:  Obesity  Hypertension  H/O eye surgery      Social History:  Tabacco -   ETOH -   Illicit drug abuse - denies    FAMILY HISTORY:  Family history of thyroid disease (Mother)  Family history of lung cancer (Father)      Allergies    penicillin (Hives; Rash)    Intolerances        HOME MEDICATIONS :     REVIEW OF SYSTEMS:    CONSTITUTIONAL: No fever, weight loss, or fatigue  EYES: No eye pain, visual disturbances, or discharge  NECK: No pain or stiffness  RESPIRATORY: No cough, wheezing, chills or hemoptysis; No shortness of breath  CARDIOVASCULAR: No chest pain, palpitations, dizziness, or leg swelling  GASTROINTESTINAL: No abdominal or epigastric pain. No nausea, vomiting, or hematemesis; No diarrhea or constipation. No melena or hematochezia.  GENITOURINARY: No dysuria, frequency, hematuria, or incontinence  NEUROLOGICAL: No headaches, memory loss, loss of strength, numbness, or tremors  SKIN: No itching, burning, rashes, or lesions   LYMPH NODES: No enlarged glands  ENDOCRINE: No heat or cold intolerance; No hair loss  MUSCULOSKELETAL:   PSYCHIATRIC: No depression, anxiety, mood swings, or difficulty sleeping  HEME/LYMPH: No easy bruising, or bleeding gums  ALLERGY AND IMMUNOLOGIC: No hives or eczema    MEDICATIONS  (STANDING):  acetaminophen   Tablet .. 975 milliGRAM(s) Oral every 8 hours  acetaminophen  IVPB .. 1000 milliGRAM(s) IV Intermittent once  cephalexin 500 milliGRAM(s) Oral four times a day  docusate sodium 100 milliGRAM(s) Oral three times a day  enoxaparin Injectable 30 milliGRAM(s) SubCutaneous every 12 hours  hydrochlorothiazide 25 milliGRAM(s) Oral daily  lactated ringers. 1000 milliLiter(s) (150 mL/Hr) IV Continuous <Continuous>  losartan 100 milliGRAM(s) Oral daily  polyethylene glycol 3350 17 Gram(s) Oral daily  sodium chloride 0.9% lock flush 3 milliLiter(s) IV Push every 8 hours  sodium chloride 0.9% lock flush 3 milliLiter(s) IV Push every 8 hours  tranexamic acid IVPB 1250 milliGRAM(s) IV Intermittent once  tranexamic acid IVPB 1250 milliGRAM(s) IV Intermittent once    MEDICATIONS  (PRN):  aluminum hydroxide/magnesium hydroxide/simethicone Suspension 30 milliLiter(s) Oral four times a day PRN Indigestion  HYDROmorphone   Tablet 2 milliGRAM(s) Oral every 3 hours PRN Severe Pain (7 - 10)  HYDROmorphone  Injectable 0.5 milliGRAM(s) IV Push every 3 hours PRN Breakthrough Pain  ketorolac   Injectable 15 milliGRAM(s) IV Push once PRN Severe Pain (7 - 10)  ketorolac   Injectable 15 milliGRAM(s) IV Push once PRN Severe Pain (7 - 10)  magnesium hydroxide Suspension 30 milliLiter(s) Oral daily PRN Constipation  ondansetron Injectable 4 milliGRAM(s) IV Push every 6 hours PRN Nausea and/or Vomiting  oxyCODONE    IR 5 milliGRAM(s) Oral every 3 hours PRN Mild Pain (1 - 3)  oxyCODONE    IR 10 milliGRAM(s) Oral every 3 hours PRN Moderate Pain (4 - 6)  senna 2 Tablet(s) Oral at bedtime PRN Constipation      Vital Signs Last 24 Hrs  T(C): 36.8 (20 Oct 2018 08:35), Max: 37.6 (19 Oct 2018 19:12)  T(F): 98.3 (20 Oct 2018 08:35), Max: 99.7 (19 Oct 2018 19:12)  HR: 76 (20 Oct 2018 12:48) (61 - 101)  BP: 125/80 (20 Oct 2018 12:48) (109/47 - 160/82)  BP(mean): --  RR: 18 (20 Oct 2018 08:35) (14 - 25)  SpO2: 96% (20 Oct 2018 08:35) (94% - 99%)    PHYSICAL EXAM:    GENERAL: NAD, well-groomed, well-developed  HEAD:  Atraumatic, Normocephalic  EYES: EOMI, PERRLA, conjunctiva and sclera clear  NECK: Supple, No JVD, Normal thyroid  NERVOUS SYSTEM:  Alert & Oriented X3, Good concentration; Motor Strength 5/5 B/L upper and lower extremities; DTRs 2+ intact and symmetric  CHEST/LUNG: CTA  b/l,  no rales, rhonchi, wheezing, or rubs  HEART: Regular rate and rhythm; No murmurs, rubs, or gallops  ABDOMEN: Soft, Nontender, Nondistended; Bowel sounds present  EXTREMITIES:  2+ Peripheral Pulses, No clubbing, cyanosis, or edema ,   LYMPH: No lymphadenopathy noted  SKIN: No rashes or lesions    LABS:                        13.2   18.6  )-----------( 199      ( 20 Oct 2018 07:37 )             38.4     10-20    136  |  97<L>  |  17.0  ----------------------------<  133<H>  3.3<L>   |  24.0  |  1.20    Ca    8.3<L>      20 Oct 2018 07:37              RADIOLOGY & ADDITIONAL STUDIES: PMD : Cherelle   Cardio :     Patient is a 58y old  Male who is s/p B/L TKA , POD # 0 , doing well pain well controlled , no other complaints     CC: b/l knee pain       HPI:  57 year old male who states that he had B/L knee pain for the last 17 years which progressively worsened.  Had cortisone inj in the past and was taking Aleve and Meloxicam with some relief , pain was getting worst lately . Now he is s/p B/L TKA , POD # 1.       PAST MEDICAL & SURGICAL HISTORY:  Obesity  Hypertension  H/O eye surgery      Social History:  Tobacco - denies  ETOH - occasionally   Illicit drug abuse - denies    FAMILY HISTORY:  Family history of thyroid disease (Mother)  Family history of lung cancer (Father)      Allergies    penicillin (Hives; Rash)    Intolerances        HOME MEDICATIONS :     REVIEW OF SYSTEMS:    CONSTITUTIONAL: No fever, weight loss, or fatigue  EYES: No eye pain, visual disturbances, or discharge  NECK: No pain or stiffness  RESPIRATORY: No cough, wheezing, chills or hemoptysis; No shortness of breath  CARDIOVASCULAR: No chest pain, palpitations, dizziness, or leg swelling  GASTROINTESTINAL: No abdominal or epigastric pain. No nausea, vomiting, or hematemesis; No diarrhea or constipation. No melena or hematochezia.  GENITOURINARY: No dysuria, frequency, hematuria, or incontinence  NEUROLOGICAL: No headaches, memory loss, loss of strength, numbness, or tremors  SKIN: No itching, burning, rashes, or lesions   LYMPH NODES: No enlarged glands  ENDOCRINE: No heat or cold intolerance; No hair loss  MUSCULOSKELETAL: B/L knee pain well controlled   PSYCHIATRIC: No depression, anxiety, mood swings, or difficulty sleeping  HEME/LYMPH: No easy bruising, or bleeding gums  ALLERGY AND IMMUNOLOGIC: No hives or eczema    MEDICATIONS  (STANDING):  acetaminophen   Tablet .. 975 milliGRAM(s) Oral every 8 hours  acetaminophen  IVPB .. 1000 milliGRAM(s) IV Intermittent once  cephalexin 500 milliGRAM(s) Oral four times a day  docusate sodium 100 milliGRAM(s) Oral three times a day  enoxaparin Injectable 30 milliGRAM(s) SubCutaneous every 12 hours  hydrochlorothiazide 25 milliGRAM(s) Oral daily  lactated ringers. 1000 milliLiter(s) (150 mL/Hr) IV Continuous <Continuous>  losartan 100 milliGRAM(s) Oral daily  polyethylene glycol 3350 17 Gram(s) Oral daily  sodium chloride 0.9% lock flush 3 milliLiter(s) IV Push every 8 hours  sodium chloride 0.9% lock flush 3 milliLiter(s) IV Push every 8 hours  tranexamic acid IVPB 1250 milliGRAM(s) IV Intermittent once  tranexamic acid IVPB 1250 milliGRAM(s) IV Intermittent once    MEDICATIONS  (PRN):  aluminum hydroxide/magnesium hydroxide/simethicone Suspension 30 milliLiter(s) Oral four times a day PRN Indigestion  HYDROmorphone   Tablet 2 milliGRAM(s) Oral every 3 hours PRN Severe Pain (7 - 10)  HYDROmorphone  Injectable 0.5 milliGRAM(s) IV Push every 3 hours PRN Breakthrough Pain  ketorolac   Injectable 15 milliGRAM(s) IV Push once PRN Severe Pain (7 - 10)  ketorolac   Injectable 15 milliGRAM(s) IV Push once PRN Severe Pain (7 - 10)  magnesium hydroxide Suspension 30 milliLiter(s) Oral daily PRN Constipation  ondansetron Injectable 4 milliGRAM(s) IV Push every 6 hours PRN Nausea and/or Vomiting  oxyCODONE    IR 5 milliGRAM(s) Oral every 3 hours PRN Mild Pain (1 - 3)  oxyCODONE    IR 10 milliGRAM(s) Oral every 3 hours PRN Moderate Pain (4 - 6)  senna 2 Tablet(s) Oral at bedtime PRN Constipation      Vital Signs Last 24 Hrs  T(C): 36.8 (20 Oct 2018 08:35), Max: 37.6 (19 Oct 2018 19:12)  T(F): 98.3 (20 Oct 2018 08:35), Max: 99.7 (19 Oct 2018 19:12)  HR: 76 (20 Oct 2018 12:48) (61 - 101)  BP: 125/80 (20 Oct 2018 12:48) (109/47 - 160/82)  BP(mean): --  RR: 18 (20 Oct 2018 08:35) (14 - 25)  SpO2: 96% (20 Oct 2018 08:35) (94% - 99%)    PHYSICAL EXAM:    GENERAL: NAD, well-groomed, well-developed  HEAD:  Atraumatic, Normocephalic  EYES: EOMI, PERRLA, conjunctiva and sclera clear  NECK: Supple, No JVD, Normal thyroid  NERVOUS SYSTEM:  Alert & Oriented X3, Good concentration; Motor Strength 5/5 B/L upper and lower extremities; DTRs 2+ intact and symmetric  CHEST/LUNG: CTA  b/l,  no rales, rhonchi, wheezing, or rubs  HEART: Regular rate and rhythm; No murmurs, rubs, or gallops  ABDOMEN: Soft, Nontender, Nondistended; Bowel sounds present  EXTREMITIES:  2+ Peripheral Pulses, No clubbing, cyanosis, or edema , b/l knee dressing + , clean and dry   LYMPH: No lymphadenopathy noted  SKIN: No rashes or lesions    LABS:                        13.2   18.6  )-----------( 199      ( 20 Oct 2018 07:37 )             38.4     10-20    136  |  97<L>  |  17.0  ----------------------------<  133<H>  3.3<L>   |  24.0  |  1.20    Ca    8.3<L>      20 Oct 2018 07:37              RADIOLOGY & ADDITIONAL STUDIES:

## 2018-10-20 NOTE — PATIENT PROFILE ADULT - BRADEN NUTRITION
76 year-old male with past medical history of T2DM, DLD, alcohol misuse brought in by family after having been found face down on the floor for approximately 2 days. On treatment for UTI, but now JANES with Aortic Valve lesions. Suspected culture negative endocarditis, noted to have valvular failure despite antibiotics, now s/p AVR. Bartonella, Legionella, Q Fever serology negative; fungal BCX negative. Brucella pending. Unclear cause of culture negative endocarditis. Aortic valve vegetation, leukocytosis, post operative state.  - Vancomycin 1g q 24 (Slightly rising creatine--trend, check trough before next dose)  - Cefepime 2 g 12 (Start tomorrow AM--received ceftriaxone in OR)  - Doxycyline 100mg q 12  - F/U pending Brucella sero  - Repeat: BCXs, Fungal BCX, Bartonella serology, Legionella serology, Brucella serology, Q fever serology with routine labs  - F/U OR cultures  - Will arrange for Bacterial, Fungal PCR test to valve tissue (sample sent to lab, discussed with Microbiology supervisor)    Baldev Small MD  Pager [phone]  After 5pm and on weekends call [phone] (3) adequate

## 2018-10-20 NOTE — PHYSICAL THERAPY INITIAL EVALUATION ADULT - CRITERIA FOR SKILLED THERAPEUTIC INTERVENTIONS
risk reduction/prevention/impairments found/functional limitations in following categories/predicted duration of therapy intervention/anticipated equipment needs at discharge/anticipated discharge recommendation/rehab potential/therapy frequency

## 2018-10-20 NOTE — CONSULT NOTE ADULT - ASSESSMENT
57 year old male who states that he had B/L knee pain for the last 17 years which progressively worsened.  Had cortisone inj in the past and was taking Aleve and Meloxicam with some relief , pain was getting worst lately . Now he is s/p B/L TKA , POD # 1.

## 2018-10-20 NOTE — PHYSICAL THERAPY INITIAL EVALUATION ADULT - GENERAL OBSERVATIONS, REHAB EVAL
patient was lying on bed with bandage on both knee longitudinal incision and compression dressing with +ICD, +IV,  +2.5 lit O2

## 2018-10-20 NOTE — CONSULT NOTE ADULT - PROBLEM SELECTOR RECOMMENDATION 6
DVT prophylaxis  - as per ortho protocol  Opioid induced constipation  prophylaxis - bowel regimen     Problem / Plan - Hypokalemia - will supplement

## 2018-10-21 LAB
ANION GAP SERPL CALC-SCNC: 13 MMOL/L — SIGNIFICANT CHANGE UP (ref 5–17)
BUN SERPL-MCNC: 22 MG/DL — HIGH (ref 8–20)
CALCIUM SERPL-MCNC: 8.1 MG/DL — LOW (ref 8.6–10.2)
CHLORIDE SERPL-SCNC: 100 MMOL/L — SIGNIFICANT CHANGE UP (ref 98–107)
CO2 SERPL-SCNC: 26 MMOL/L — SIGNIFICANT CHANGE UP (ref 22–29)
CREAT SERPL-MCNC: 1.38 MG/DL — HIGH (ref 0.5–1.3)
GLUCOSE SERPL-MCNC: 120 MG/DL — HIGH (ref 70–115)
HCT VFR BLD CALC: 36.4 % — LOW (ref 42–52)
HGB BLD-MCNC: 12.1 G/DL — LOW (ref 14–18)
MCHC RBC-ENTMCNC: 27.9 PG — SIGNIFICANT CHANGE UP (ref 27–31)
MCHC RBC-ENTMCNC: 33.2 G/DL — SIGNIFICANT CHANGE UP (ref 32–36)
MCV RBC AUTO: 84.1 FL — SIGNIFICANT CHANGE UP (ref 80–94)
PLATELET # BLD AUTO: 203 K/UL — SIGNIFICANT CHANGE UP (ref 150–400)
POTASSIUM SERPL-MCNC: 4.1 MMOL/L — SIGNIFICANT CHANGE UP (ref 3.5–5.3)
POTASSIUM SERPL-SCNC: 4.1 MMOL/L — SIGNIFICANT CHANGE UP (ref 3.5–5.3)
RBC # BLD: 4.33 M/UL — LOW (ref 4.6–6.2)
RBC # FLD: 13.4 % — SIGNIFICANT CHANGE UP (ref 11–15.6)
SODIUM SERPL-SCNC: 139 MMOL/L — SIGNIFICANT CHANGE UP (ref 135–145)
WBC # BLD: 13.7 K/UL — HIGH (ref 4.8–10.8)
WBC # FLD AUTO: 13.7 K/UL — HIGH (ref 4.8–10.8)

## 2018-10-21 PROCEDURE — 99232 SBSQ HOSP IP/OBS MODERATE 35: CPT

## 2018-10-21 RX ORDER — OXYCODONE HYDROCHLORIDE 5 MG/1
20 TABLET ORAL EVERY 12 HOURS
Qty: 0 | Refills: 0 | Status: DISCONTINUED | OUTPATIENT
Start: 2018-10-21 | End: 2018-10-22

## 2018-10-21 RX ORDER — BENZOCAINE AND MENTHOL 5; 1 G/100ML; G/100ML
1 LIQUID ORAL EVERY 4 HOURS
Qty: 0 | Refills: 0 | Status: DISCONTINUED | OUTPATIENT
Start: 2018-10-21 | End: 2018-10-22

## 2018-10-21 RX ADMIN — Medication 975 MILLIGRAM(S): at 21:46

## 2018-10-21 RX ADMIN — SODIUM CHLORIDE 3 MILLILITER(S): 9 INJECTION INTRAMUSCULAR; INTRAVENOUS; SUBCUTANEOUS at 13:08

## 2018-10-21 RX ADMIN — Medication 15 MILLIGRAM(S): at 06:48

## 2018-10-21 RX ADMIN — Medication 975 MILLIGRAM(S): at 06:48

## 2018-10-21 RX ADMIN — ENOXAPARIN SODIUM 30 MILLIGRAM(S): 100 INJECTION SUBCUTANEOUS at 05:36

## 2018-10-21 RX ADMIN — Medication 975 MILLIGRAM(S): at 13:08

## 2018-10-21 RX ADMIN — HYDROMORPHONE HYDROCHLORIDE 0.5 MILLIGRAM(S): 2 INJECTION INTRAMUSCULAR; INTRAVENOUS; SUBCUTANEOUS at 23:19

## 2018-10-21 RX ADMIN — HYDROMORPHONE HYDROCHLORIDE 2 MILLIGRAM(S): 2 INJECTION INTRAMUSCULAR; INTRAVENOUS; SUBCUTANEOUS at 21:48

## 2018-10-21 RX ADMIN — Medication 500 MILLIGRAM(S): at 05:36

## 2018-10-21 RX ADMIN — SCOPALAMINE 1 PATCH: 1 PATCH, EXTENDED RELEASE TRANSDERMAL at 20:36

## 2018-10-21 RX ADMIN — OXYCODONE HYDROCHLORIDE 10 MILLIGRAM(S): 5 TABLET ORAL at 17:14

## 2018-10-21 RX ADMIN — ENOXAPARIN SODIUM 30 MILLIGRAM(S): 100 INJECTION SUBCUTANEOUS at 17:12

## 2018-10-21 RX ADMIN — Medication 100 MILLIGRAM(S): at 21:46

## 2018-10-21 RX ADMIN — Medication 975 MILLIGRAM(S): at 12:45

## 2018-10-21 RX ADMIN — Medication 975 MILLIGRAM(S): at 22:30

## 2018-10-21 RX ADMIN — OXYCODONE HYDROCHLORIDE 10 MILLIGRAM(S): 5 TABLET ORAL at 13:45

## 2018-10-21 RX ADMIN — OXYCODONE HYDROCHLORIDE 20 MILLIGRAM(S): 5 TABLET ORAL at 17:12

## 2018-10-21 RX ADMIN — Medication 500 MILLIGRAM(S): at 17:14

## 2018-10-21 RX ADMIN — Medication 100 MILLIGRAM(S): at 12:46

## 2018-10-21 RX ADMIN — LOSARTAN POTASSIUM 100 MILLIGRAM(S): 100 TABLET, FILM COATED ORAL at 05:37

## 2018-10-21 RX ADMIN — Medication 15 MILLIGRAM(S): at 05:41

## 2018-10-21 RX ADMIN — OXYCODONE HYDROCHLORIDE 10 MILLIGRAM(S): 5 TABLET ORAL at 18:15

## 2018-10-21 RX ADMIN — POLYETHYLENE GLYCOL 3350 17 GRAM(S): 17 POWDER, FOR SOLUTION ORAL at 12:46

## 2018-10-21 RX ADMIN — OXYCODONE HYDROCHLORIDE 10 MILLIGRAM(S): 5 TABLET ORAL at 09:50

## 2018-10-21 RX ADMIN — OXYCODONE HYDROCHLORIDE 10 MILLIGRAM(S): 5 TABLET ORAL at 12:53

## 2018-10-21 RX ADMIN — HYDROMORPHONE HYDROCHLORIDE 2 MILLIGRAM(S): 2 INJECTION INTRAMUSCULAR; INTRAVENOUS; SUBCUTANEOUS at 20:36

## 2018-10-21 RX ADMIN — SODIUM CHLORIDE 3 MILLILITER(S): 9 INJECTION INTRAMUSCULAR; INTRAVENOUS; SUBCUTANEOUS at 21:46

## 2018-10-21 RX ADMIN — Medication 25 MILLIGRAM(S): at 05:37

## 2018-10-21 RX ADMIN — OXYCODONE HYDROCHLORIDE 20 MILLIGRAM(S): 5 TABLET ORAL at 18:15

## 2018-10-21 RX ADMIN — OXYCODONE HYDROCHLORIDE 10 MILLIGRAM(S): 5 TABLET ORAL at 10:48

## 2018-10-21 RX ADMIN — SODIUM CHLORIDE 3 MILLILITER(S): 9 INJECTION INTRAMUSCULAR; INTRAVENOUS; SUBCUTANEOUS at 05:43

## 2018-10-21 RX ADMIN — HYDROMORPHONE HYDROCHLORIDE 0.5 MILLIGRAM(S): 2 INJECTION INTRAMUSCULAR; INTRAVENOUS; SUBCUTANEOUS at 23:50

## 2018-10-21 RX ADMIN — Medication 975 MILLIGRAM(S): at 05:35

## 2018-10-21 RX ADMIN — OXYCODONE HYDROCHLORIDE 10 MILLIGRAM(S): 5 TABLET ORAL at 05:36

## 2018-10-21 RX ADMIN — Medication 100 MILLIGRAM(S): at 05:35

## 2018-10-21 RX ADMIN — OXYCODONE HYDROCHLORIDE 10 MILLIGRAM(S): 5 TABLET ORAL at 12:46

## 2018-10-21 RX ADMIN — Medication 500 MILLIGRAM(S): at 12:56

## 2018-10-21 RX ADMIN — SCOPALAMINE 1 PATCH: 1 PATCH, EXTENDED RELEASE TRANSDERMAL at 06:48

## 2018-10-21 NOTE — PROGRESS NOTE ADULT - ATTENDING COMMENTS
Pain was poorly controlled last evening, but much better today.  Receiving oxycodone prn and oxycontin/tylenol.  Discussed his regimen - will try to avoid IV push dilaudid if possible rest of the day today and if controlled on oral narcotics will plan for DC home tomorrow.  Discussed that if oxycodone is not adequate (currently it is) we can change to oral dilaudid.  Otherwise progressing well, did stairs.  Bilateral knee dressings dry, NVID BLE. Lovenox DVT ppx.

## 2018-10-21 NOTE — PROGRESS NOTE ADULT - ASSESSMENT
57 year old male who states that he had B/L knee pain for the last 17 years which progressively worsened.  Had cortisone inj in the past and was taking Aleve and Meloxicam with some relief , pain was getting worst lately . Now he is s/p B/L TKA , POD # 2 . Pain well controlled , no other complaints .        Problem/Recommendation - 1:  Problem: Primary osteoarthritis of both knees. Recommendation: s/p b/l  TKA      Problem/Recommendation - 2:  ·  Problem: Status post bilateral knee replacements.  Recommendation: PT/OT/pain mgmt  DVT prophylaxis- as per ortho  Abx as per SCIP  Incentive spirometry  Prophylaxis of opioid  induced constipation.      Problem/Recommendation - 3:  ·  Problem: Essential hypertension.  Recommendation: continue home meds with parameters.      Problem/Recommendation - 4:  ·  Problem: Leukocytosis, unspecified type.  Recommendation: - no S/AS of infection - likely reactive - resolving      Problem/Recommendation - 5:  ·  Problem: Acute blood loss as cause of postoperative anemia.  Recommendation: asymptomatic.      Problem/Recommendation - 6:  Problem: Need for prophylactic measure. Recommendation: DVT prophylaxis  - as per ortho protocol - on Lovenox   Opioid induced constipation  prophylaxis - bowel regimen     Problem / Plan - Hypokalemia - supplemented     Problem / Plan - Decreased eGFR - likely pre renal azotemia - patient advised to hold on diuretics for 2 days and to increase PO fluid intake .   Medically stable to d/c once cleared by physical therapy / ortho .

## 2018-10-22 ENCOUNTER — OTHER (OUTPATIENT)
Age: 58
End: 2018-10-22

## 2018-10-22 VITALS
TEMPERATURE: 99 F | OXYGEN SATURATION: 95 % | HEART RATE: 86 BPM | DIASTOLIC BLOOD PRESSURE: 74 MMHG | SYSTOLIC BLOOD PRESSURE: 134 MMHG | RESPIRATION RATE: 18 BRPM

## 2018-10-22 PROCEDURE — 73562 X-RAY EXAM OF KNEE 3: CPT

## 2018-10-22 PROCEDURE — 97530 THERAPEUTIC ACTIVITIES: CPT

## 2018-10-22 PROCEDURE — 85027 COMPLETE CBC AUTOMATED: CPT

## 2018-10-22 PROCEDURE — C1713: CPT

## 2018-10-22 PROCEDURE — C1776: CPT

## 2018-10-22 PROCEDURE — 73560 X-RAY EXAM OF KNEE 1 OR 2: CPT

## 2018-10-22 PROCEDURE — 73562 X-RAY EXAM OF KNEE 3: CPT | Mod: 26,LT

## 2018-10-22 PROCEDURE — 99232 SBSQ HOSP IP/OBS MODERATE 35: CPT

## 2018-10-22 PROCEDURE — 82962 GLUCOSE BLOOD TEST: CPT

## 2018-10-22 PROCEDURE — 90686 IIV4 VACC NO PRSV 0.5 ML IM: CPT

## 2018-10-22 PROCEDURE — 36415 COLL VENOUS BLD VENIPUNCTURE: CPT

## 2018-10-22 PROCEDURE — 97116 GAIT TRAINING THERAPY: CPT

## 2018-10-22 PROCEDURE — 97110 THERAPEUTIC EXERCISES: CPT

## 2018-10-22 PROCEDURE — 80048 BASIC METABOLIC PNL TOTAL CA: CPT

## 2018-10-22 PROCEDURE — 97162 PT EVAL MOD COMPLEX 30 MIN: CPT

## 2018-10-22 RX ORDER — HYDROMORPHONE HYDROCHLORIDE 2 MG/ML
4 INJECTION INTRAMUSCULAR; INTRAVENOUS; SUBCUTANEOUS
Qty: 0 | Refills: 0 | Status: DISCONTINUED | OUTPATIENT
Start: 2018-10-22 | End: 2018-10-22

## 2018-10-22 RX ORDER — SENNOSIDES/DOCUSATE SODIUM 8.6MG-50MG
1 TABLET ORAL
Qty: 60 | Refills: 0 | OUTPATIENT
Start: 2018-10-22 | End: 2018-11-20

## 2018-10-22 RX ORDER — OXYCODONE HYDROCHLORIDE 5 MG/1
1 TABLET ORAL
Qty: 6 | Refills: 0 | OUTPATIENT
Start: 2018-10-22 | End: 2018-10-24

## 2018-10-22 RX ORDER — HYDROMORPHONE HYDROCHLORIDE 2 MG/ML
1 INJECTION INTRAMUSCULAR; INTRAVENOUS; SUBCUTANEOUS
Qty: 40 | Refills: 0 | OUTPATIENT
Start: 2018-10-22

## 2018-10-22 RX ORDER — MELOXICAM 15 MG/1
1 TABLET ORAL
Qty: 0 | Refills: 0 | COMMUNITY

## 2018-10-22 RX ORDER — CELECOXIB 200 MG/1
200 CAPSULE ORAL
Qty: 0 | Refills: 0 | Status: DISCONTINUED | OUTPATIENT
Start: 2018-10-22 | End: 2018-10-22

## 2018-10-22 RX ORDER — CELECOXIB 200 MG/1
1 CAPSULE ORAL
Qty: 42 | Refills: 0 | OUTPATIENT
Start: 2018-10-22 | End: 2018-11-11

## 2018-10-22 RX ORDER — HYDROMORPHONE HYDROCHLORIDE 2 MG/ML
2 INJECTION INTRAMUSCULAR; INTRAVENOUS; SUBCUTANEOUS
Qty: 0 | Refills: 0 | Status: DISCONTINUED | OUTPATIENT
Start: 2018-10-22 | End: 2018-10-22

## 2018-10-22 RX ORDER — INFLUENZA VIRUS VACCINE 15; 15; 15; 15 UG/.5ML; UG/.5ML; UG/.5ML; UG/.5ML
0.5 SUSPENSION INTRAMUSCULAR ONCE
Qty: 0 | Refills: 0 | Status: COMPLETED | OUTPATIENT
Start: 2018-10-22 | End: 2018-10-22

## 2018-10-22 RX ORDER — ENOXAPARIN SODIUM 100 MG/ML
30 INJECTION SUBCUTANEOUS
Qty: 26 | Refills: 0 | OUTPATIENT
Start: 2018-10-22 | End: 2018-11-03

## 2018-10-22 RX ADMIN — INFLUENZA VIRUS VACCINE 0.5 MILLILITER(S): 15; 15; 15; 15 SUSPENSION INTRAMUSCULAR at 13:39

## 2018-10-22 RX ADMIN — HYDROMORPHONE HYDROCHLORIDE 4 MILLIGRAM(S): 2 INJECTION INTRAMUSCULAR; INTRAVENOUS; SUBCUTANEOUS at 17:00

## 2018-10-22 RX ADMIN — OXYCODONE HYDROCHLORIDE 20 MILLIGRAM(S): 5 TABLET ORAL at 07:30

## 2018-10-22 RX ADMIN — ENOXAPARIN SODIUM 30 MILLIGRAM(S): 100 INJECTION SUBCUTANEOUS at 07:02

## 2018-10-22 RX ADMIN — Medication 500 MILLIGRAM(S): at 01:02

## 2018-10-22 RX ADMIN — ENOXAPARIN SODIUM 30 MILLIGRAM(S): 100 INJECTION SUBCUTANEOUS at 17:12

## 2018-10-22 RX ADMIN — OXYCODONE HYDROCHLORIDE 20 MILLIGRAM(S): 5 TABLET ORAL at 06:57

## 2018-10-22 RX ADMIN — OXYCODONE HYDROCHLORIDE 10 MILLIGRAM(S): 5 TABLET ORAL at 01:04

## 2018-10-22 RX ADMIN — OXYCODONE HYDROCHLORIDE 20 MILLIGRAM(S): 5 TABLET ORAL at 17:15

## 2018-10-22 RX ADMIN — BENZOCAINE AND MENTHOL 1 LOZENGE: 5; 1 LIQUID ORAL at 11:13

## 2018-10-22 RX ADMIN — Medication 975 MILLIGRAM(S): at 12:15

## 2018-10-22 RX ADMIN — HYDROMORPHONE HYDROCHLORIDE 4 MILLIGRAM(S): 2 INJECTION INTRAMUSCULAR; INTRAVENOUS; SUBCUTANEOUS at 12:15

## 2018-10-22 RX ADMIN — OXYCODONE HYDROCHLORIDE 10 MILLIGRAM(S): 5 TABLET ORAL at 02:00

## 2018-10-22 RX ADMIN — SODIUM CHLORIDE 3 MILLILITER(S): 9 INJECTION INTRAMUSCULAR; INTRAVENOUS; SUBCUTANEOUS at 06:59

## 2018-10-22 RX ADMIN — Medication 500 MILLIGRAM(S): at 17:12

## 2018-10-22 RX ADMIN — HYDROMORPHONE HYDROCHLORIDE 4 MILLIGRAM(S): 2 INJECTION INTRAMUSCULAR; INTRAVENOUS; SUBCUTANEOUS at 11:12

## 2018-10-22 RX ADMIN — Medication 100 MILLIGRAM(S): at 06:57

## 2018-10-22 RX ADMIN — Medication 500 MILLIGRAM(S): at 11:13

## 2018-10-22 RX ADMIN — HYDROMORPHONE HYDROCHLORIDE 2 MILLIGRAM(S): 2 INJECTION INTRAMUSCULAR; INTRAVENOUS; SUBCUTANEOUS at 04:17

## 2018-10-22 RX ADMIN — Medication 400 MILLIGRAM(S): at 04:05

## 2018-10-22 RX ADMIN — SCOPALAMINE 1 PATCH: 1 PATCH, EXTENDED RELEASE TRANSDERMAL at 11:14

## 2018-10-22 RX ADMIN — CELECOXIB 200 MILLIGRAM(S): 200 CAPSULE ORAL at 17:12

## 2018-10-22 RX ADMIN — Medication 975 MILLIGRAM(S): at 07:30

## 2018-10-22 RX ADMIN — Medication 975 MILLIGRAM(S): at 06:57

## 2018-10-22 RX ADMIN — Medication 500 MILLIGRAM(S): at 06:57

## 2018-10-22 RX ADMIN — HYDROMORPHONE HYDROCHLORIDE 4 MILLIGRAM(S): 2 INJECTION INTRAMUSCULAR; INTRAVENOUS; SUBCUTANEOUS at 15:57

## 2018-10-22 RX ADMIN — POLYETHYLENE GLYCOL 3350 17 GRAM(S): 17 POWDER, FOR SOLUTION ORAL at 11:13

## 2018-10-22 RX ADMIN — Medication 975 MILLIGRAM(S): at 11:14

## 2018-10-22 RX ADMIN — Medication 100 MILLIGRAM(S): at 11:13

## 2018-10-22 NOTE — PROGRESS NOTE ADULT - SUBJECTIVE AND OBJECTIVE BOX
MCKINLEY KRISHNA    [id number]    History: 58y Male is status post bilateral total knee arthroplasty POD # 3. Patient is doing well and is comfortable this morning but required IV dilaudid for breakthrough pain overnight. Xrays overnight taken after sudden pain in the left knee were unremarkable and show no fracture and the patella is centered on the AP view.  The patient's pain is now controlled using the prescribed pain medications however seems to respond better to dilaudid than oxycodone. The patient is participating in physical therapy. Denies nausea, vomiting, chest pain, shortness of breath, abdominal pain or fever. No new complaints.                        12.1   13.7  )-----------( 203      ( 21 Oct 2018 06:17 )             36.4     10-21    139  |  100  |  22.0<H>  ----------------------------<  120<H>  4.1   |  26.0  |  1.38<H>    Ca    8.1<L>      21 Oct 2018 06:17        MEDICATIONS  (STANDING):  acetaminophen   Tablet .. 975 milliGRAM(s) Oral every 8 hours  cephalexin 500 milliGRAM(s) Oral four times a day  docusate sodium 100 milliGRAM(s) Oral three times a day  enoxaparin Injectable 30 milliGRAM(s) SubCutaneous every 12 hours  hydrochlorothiazide 25 milliGRAM(s) Oral daily  losartan 100 milliGRAM(s) Oral daily  oxyCODONE  ER Tablet 20 milliGRAM(s) Oral every 12 hours  polyethylene glycol 3350 17 Gram(s) Oral daily  sodium chloride 0.9% lock flush 3 milliLiter(s) IV Push every 8 hours  sodium chloride 0.9% lock flush 3 milliLiter(s) IV Push every 8 hours  tranexamic acid IVPB 1250 milliGRAM(s) IV Intermittent once  tranexamic acid IVPB 1250 milliGRAM(s) IV Intermittent once    MEDICATIONS  (PRN):  aluminum hydroxide/magnesium hydroxide/simethicone Suspension 30 milliLiter(s) Oral four times a day PRN Indigestion  benzocaine 15 mG/menthol 3.6 mG Lozenge 1 Lozenge Oral every 4 hours PRN Sore Throat  bisacodyl Suppository 10 milliGRAM(s) Rectal daily PRN If no bowel movement by postoperative day #2  HYDROmorphone   Tablet 2 milliGRAM(s) Oral every 3 hours PRN Severe Pain (7 - 10)  HYDROmorphone  Injectable 0.5 milliGRAM(s) IV Push every 3 hours PRN Breakthrough Pain  magnesium hydroxide Suspension 30 milliLiter(s) Oral daily PRN Constipation  ondansetron Injectable 4 milliGRAM(s) IV Push every 6 hours PRN Nausea and/or Vomiting  oxyCODONE    IR 5 milliGRAM(s) Oral every 3 hours PRN Mild Pain (1 - 3)  oxyCODONE    IR 10 milliGRAM(s) Oral every 3 hours PRN Moderate Pain (4 - 6)  senna 2 Tablet(s) Oral at bedtime PRN Constipation      Physical exam: The bilateral knee dressings are clean, dry and intact. No drainage or discharge. No erythema is noted. No blistering. No ecchymosis. The calf is supple nontender. Passive range of motion is acceptable to due postoperative pain. No calf tenderness. Sensation to light touch is grossly intact distally. Motor function distally is 5/5. Extensor hallucis longus and flexor hallucis longus are intact. No foot drop. 2+ dorsalis pedis pulse. Capillary refill is less than 2 seconds. No cyanosis.    Primary Orthopedic Assessment:  • s/p Bilateral total knee replacement    Plan:   • DVT prophylaxis as prescribed - Lovenox, including use of compression devices and ankle pumps  • Continue physical therapy  • Weightbearing as tolerated of the bilateral lower extremity with assistance of a walker  • Incentive spirometry encouraged  • Pain control as clinically indicated  • Discharge planning – anticipated discharge is Home today if pain remains controlled on oral dilaudid regimen
Called by DEONTE Atkins to evaluate pt c/o sudden left knee pain. Pt seen and evaluated at bedside. Pt states that he tried to reposition his leg and moved it sideways when he felt a sudden sharp pain on the side of his knee. Pt otherwise denies numbness/tingling and has no other complaints.    Physical Exam     LLE- +TTP of the left lateral knee with normal post op swelling present, dressing c/d/i, +plantar/dorsiflexion/EHL/FHL intact, decreased ROM consistent with baseline ROM s/p TKA prior to experiencing this episode of sudden pain, compartments soft and compressible, no calf tenderness, SLIT, 2+ DP/PT pulses present.
MCKINLEY KRISHNA  [id number]    History: 58y Male is status post Bilateral total knee arthroplasty on 10/19, POD#2.   Patient is doing well and is comfortable. The patient's pain is controlled using the prescribed pain medications. The patient is participating in physical therapy, WBAT.   Denies nausea, vomiting, chest pain, shortness of breath, abdominal pain or fever. No new complaints.                        13.2   18.6  )-----------( 199      ( 20 Oct 2018 07:37 )             38.4     10-21    139  |  100  |  22.0<H>  ----------------------------<  120<H>  4.1   |  26.0  |  1.38<H>    Ca    8.1<L>      21 Oct 2018 06:17        MEDICATIONS  (STANDING):  acetaminophen   Tablet .. 975 milliGRAM(s) Oral every 8 hours  acetaminophen  IVPB .. 1000 milliGRAM(s) IV Intermittent once  cephalexin 500 milliGRAM(s) Oral four times a day  docusate sodium 100 milliGRAM(s) Oral three times a day  enoxaparin Injectable 30 milliGRAM(s) SubCutaneous every 12 hours  hydrochlorothiazide 25 milliGRAM(s) Oral daily  lactated ringers. 1000 milliLiter(s) (150 mL/Hr) IV Continuous <Continuous>  losartan 100 milliGRAM(s) Oral daily  polyethylene glycol 3350 17 Gram(s) Oral daily  sodium chloride 0.9% lock flush 3 milliLiter(s) IV Push every 8 hours  sodium chloride 0.9% lock flush 3 milliLiter(s) IV Push every 8 hours  tranexamic acid IVPB 1250 milliGRAM(s) IV Intermittent once  tranexamic acid IVPB 1250 milliGRAM(s) IV Intermittent once    MEDICATIONS  (PRN):  aluminum hydroxide/magnesium hydroxide/simethicone Suspension 30 milliLiter(s) Oral four times a day PRN Indigestion  bisacodyl Suppository 10 milliGRAM(s) Rectal daily PRN If no bowel movement by postoperative day #2  HYDROmorphone   Tablet 2 milliGRAM(s) Oral every 3 hours PRN Severe Pain (7 - 10)  HYDROmorphone  Injectable 0.5 milliGRAM(s) IV Push every 3 hours PRN Breakthrough Pain  magnesium hydroxide Suspension 30 milliLiter(s) Oral daily PRN Constipation  ondansetron Injectable 4 milliGRAM(s) IV Push every 6 hours PRN Nausea and/or Vomiting  oxyCODONE    IR 5 milliGRAM(s) Oral every 3 hours PRN Mild Pain (1 - 3)  oxyCODONE    IR 10 milliGRAM(s) Oral every 3 hours PRN Moderate Pain (4 - 6)  senna 2 Tablet(s) Oral at bedtime PRN Constipation      Physical exam: The Bilateral  knee surgical  dressing remain clean, dry and intact. Damien stockings removed.  incisions remains clean dry and intact.  New dressings placed Bilaterally.  No drainage or discharge. No erythema is noted. No blistering. No ecchymosis. The calf is supple nontender. Passive range of motion is acceptable to due postoperative pain. No calf tenderness. Sensation to light touch is grossly intact distally. Motor function distally is 5/5. Extensor hallucis longus and flexor hallucis longus are intact. No foot drop. 2+ dorsalis pedis pulse. Capillary refill is less than 2 seconds. No cyanosis.    Primary Orthopedic Assessment:  •	s/p Bilateral total knee replacement    Secondary  Orthopedic Assessment(s):   •	    Secondary  Medical Assessment(s):   •	    Plan:   •	DVT prophylaxis as prescribed, including use of compression devices and ankle pumps  •	Continue physical therapy  •	Weightbearing as tolerated of the bilateral total knees  with assistance of a walker  •	Incentive spirometry encouraged  •	Pain control as clinically indicated  •	Discharge planning – anticipated discharge is Home sun vs monday
MCKINLEY KRISHNA  [id number]    History: 58y Male is status post bilateral total knee arthroplasty, POD # 1. Patient is doing well and is comfortable. The patient's pain is controlled using the prescribed pain medications . Denies nausea, vomiting, chest pain, shortness of breath, abdominal pain, LE N/T. No new complaints.    Vital Signs Last 24 Hrs  T(C): 36.8 (20 Oct 2018 08:35), Max: 37.6 (19 Oct 2018 19:12)  T(F): 98.3 (20 Oct 2018 08:35), Max: 99.7 (19 Oct 2018 19:12)  HR: 67 (20 Oct 2018 08:35) (61 - 101)  BP: 109/59 (20 Oct 2018 08:35) (109/47 - 160/82)  BP(mean): --  RR: 18 (20 Oct 2018 08:35) (14 - 25)  SpO2: 96% (20 Oct 2018 08:35) (94% - 99%)                              13.2   18.6  )-----------( 199      ( 20 Oct 2018 07:37 )             38.4     10-20    136  |  97<L>  |  17.0  ----------------------------<  133<H>  3.3<L>   |  24.0  |  1.20    Ca    8.3<L>      20 Oct 2018 07:37        MEDICATIONS  (STANDING):  acetaminophen   Tablet .. 975 milliGRAM(s) Oral every 8 hours  acetaminophen  IVPB .. 1000 milliGRAM(s) IV Intermittent once  cephalexin 500 milliGRAM(s) Oral four times a day  docusate sodium 100 milliGRAM(s) Oral three times a day  enoxaparin Injectable 30 milliGRAM(s) SubCutaneous every 12 hours  hydrochlorothiazide 25 milliGRAM(s) Oral daily  lactated ringers. 1000 milliLiter(s) (150 mL/Hr) IV Continuous <Continuous>  losartan 100 milliGRAM(s) Oral daily  polyethylene glycol 3350 17 Gram(s) Oral daily  sodium chloride 0.9% lock flush 3 milliLiter(s) IV Push every 8 hours  sodium chloride 0.9% lock flush 3 milliLiter(s) IV Push every 8 hours  tranexamic acid IVPB 1250 milliGRAM(s) IV Intermittent once  tranexamic acid IVPB 1250 milliGRAM(s) IV Intermittent once    MEDICATIONS  (PRN):  aluminum hydroxide/magnesium hydroxide/simethicone Suspension 30 milliLiter(s) Oral four times a day PRN Indigestion  HYDROmorphone   Tablet 2 milliGRAM(s) Oral every 3 hours PRN Severe Pain (7 - 10)  HYDROmorphone  Injectable 0.5 milliGRAM(s) IV Push every 3 hours PRN Breakthrough Pain  ketorolac   Injectable 15 milliGRAM(s) IV Push once PRN Severe Pain (7 - 10)  ketorolac   Injectable 15 milliGRAM(s) IV Push once PRN Severe Pain (7 - 10)  magnesium hydroxide Suspension 30 milliLiter(s) Oral daily PRN Constipation  ondansetron Injectable 4 milliGRAM(s) IV Push every 6 hours PRN Nausea and/or Vomiting  oxyCODONE    IR 5 milliGRAM(s) Oral every 3 hours PRN Mild Pain (1 - 3)  oxyCODONE    IR 10 milliGRAM(s) Oral every 3 hours PRN Moderate Pain (4 - 6)  senna 2 Tablet(s) Oral at bedtime PRN Constipation      Physical exam: The bilateral knee dressing is clean, dry and intact. No drainage or discharge. No erythema is noted. No blistering. No ecchymosis. The calf is supple nontender. Passive range of motion is acceptable to due postoperative pain. No calf tenderness. Sensation to light touch is grossly intact distally. Motor function distally is 5/5. Extensor hallucis longus and flexor hallucis longus are intact. No foot drop. 2+ dorsalis pedis pulse. Capillary refill is less than 2 seconds. No cyanosis.    Primary Orthopedic Assessment:  •	s/p bilateral total knee replacement    Secondary  Orthopedic Assessment(s):   •	    Secondary  Medical Assessment(s):   •	    Plan:   •	DVT prophylaxis: Lovenox 30mg BID, use of compression devices and ankle pumps  •	Continue physical therapy  •	Weightbearing as tolerated of the right lower extremity with assistance of a walker  •	Incentive spirometry encouraged  •	Pain control as clinically indicated  •	Discharge planning – anticipated discharge is Home vs subacute rehabilitation vs acute rehabilitation
Ortho Post Op Check    Name: MCKINLEY KRISHNA    MR #: [id number]    Procedure: B/L total knee arthroplasty   Surgeon: Kaylie    Pt comfortable without complaints, pain controlled  Denies CP, SOB, N/V, numbness/tingling     General Exam:  Vital Signs Last 24 Hrs  T(C): 36.7 (10-19-18 @ 22:46), Max: 37.6 (10-19-18 @ 19:12)  T(F): 98.1 (10-19-18 @ 22:46), Max: 99.7 (10-19-18 @ 19:12)  HR: 81 (10-19-18 @ 22:46) (61 - 101)  BP: 145/81 (10-19-18 @ 22:46) (139/72 - 160/82)  BP(mean): --  RR: 18 (10-19-18 @ 22:46) (14 - 25)  SpO2: 99% (10-19-18 @ 22:46) (94% - 99%)    General: Pt Alert and oriented, NAD, controlled pain.  Dressings C/D/I. No bleeding. B/L  Pulses: 2+ dorsalis pedis pulse. Cap refill < 2 sec. B/L  Sensation: Grossly intact to light touch without deficit. B/L  Motor: + EHL/FHL/TA/GS B/L  : villagomez in place    Post-op X-Ray:    B/L knee films reviewed. Implants are in appropriate position. No fracture or dislocation noted. Patient is WBAT of the surgical extremities.     A/P: 58yMale POD#0 s/p B/L total knee arthroplasty   - Stable  - Pain Control  - DVT ppx: lovenox  - Post op abx: ancef, vanco, keflex  - PT eval pending  - Weight bearing status: WBAT
PA - Note    S/P Bilateral Total Knee Arthroplasty, POD #3.  Patient is found sitting up in bed with family member at bedside.  He is in good spirts but complaining of pain.  Dr. Lott was also in room and discussed changing pain medications for both hospital and home.  Otherwise no new issues overnight.  Waiting for clearance to be discharged home.      Vital Signs Last 24 Hrs  T(C): 37.1 (22 Oct 2018 05:32), Max: 37.7 (22 Oct 2018 00:42)  T(F): 98.8 (22 Oct 2018 05:32), Max: 99.8 (22 Oct 2018 00:42)  HR: 76 (22 Oct 2018 05:32) (70 - 86)  BP: 110/69 (22 Oct 2018 05:32) (105/63 - 126/73)  BP(mean): --  RR: 18 (22 Oct 2018 05:32) (18 - 19)  SpO2: 94% (22 Oct 2018 05:32) (94% - 97%)    Bilateral Lower Extremities:  Honeycomb dressings bilaterally remaining C/D/I without any signs of drainage  Calves soft, non-tender  + Sensation  + ROM of toes, + Dorsal/Plantar flexion of feet  2+ Pedal pulses    A/P: Bilateral Total Knee Arthroplasty  - OOB, PT, WBAT  - Pain medication as needed for comfort  - DVT prophylaxis: Lovenox  - D/C Planning (Home)  - Stable from orthopedic standpoint for discharge
Patient seen and examined . S/p B/L TKA  , POD # 2 . Pain well controlled , no  other complaints     CC : b/l knee pain       MEDICATIONS  (STANDING):  acetaminophen   Tablet .. 975 milliGRAM(s) Oral every 8 hours  acetaminophen  IVPB .. 1000 milliGRAM(s) IV Intermittent once  cephalexin 500 milliGRAM(s) Oral four times a day  docusate sodium 100 milliGRAM(s) Oral three times a day  enoxaparin Injectable 30 milliGRAM(s) SubCutaneous every 12 hours  hydrochlorothiazide 25 milliGRAM(s) Oral daily  lactated ringers. 1000 milliLiter(s) (150 mL/Hr) IV Continuous <Continuous>  losartan 100 milliGRAM(s) Oral daily  polyethylene glycol 3350 17 Gram(s) Oral daily  sodium chloride 0.9% lock flush 3 milliLiter(s) IV Push every 8 hours  sodium chloride 0.9% lock flush 3 milliLiter(s) IV Push every 8 hours  tranexamic acid IVPB 1250 milliGRAM(s) IV Intermittent once  tranexamic acid IVPB 1250 milliGRAM(s) IV Intermittent once    MEDICATIONS  (PRN):  aluminum hydroxide/magnesium hydroxide/simethicone Suspension 30 milliLiter(s) Oral four times a day PRN Indigestion  bisacodyl Suppository 10 milliGRAM(s) Rectal daily PRN If no bowel movement by postoperative day #2  HYDROmorphone   Tablet 2 milliGRAM(s) Oral every 3 hours PRN Severe Pain (7 - 10)  HYDROmorphone  Injectable 0.5 milliGRAM(s) IV Push every 3 hours PRN Breakthrough Pain  magnesium hydroxide Suspension 30 milliLiter(s) Oral daily PRN Constipation  ondansetron Injectable 4 milliGRAM(s) IV Push every 6 hours PRN Nausea and/or Vomiting  oxyCODONE    IR 5 milliGRAM(s) Oral every 3 hours PRN Mild Pain (1 - 3)  oxyCODONE    IR 10 milliGRAM(s) Oral every 3 hours PRN Moderate Pain (4 - 6)  senna 2 Tablet(s) Oral at bedtime PRN Constipation      LABS:                          12.1   13.7  )-----------( 203      ( 21 Oct 2018 06:17 )             36.4     10-21    139  |  100  |  22.0<H>  ----------------------------<  120<H>  4.1   |  26.0  |  1.38<H>    Ca    8.1<L>      21 Oct 2018 06:17        REVIEW OF SYSTEMS:    CONSTITUTIONAL: No fever, weight loss, or fatigue  EYES: No eye pain, visual disturbances, or discharge  ENMT:  No difficulty hearing, tinnitus, vertigo; No sinus or throat pain  NECK: No pain or stiffness  RESPIRATORY: No cough, wheezing, chills or hemoptysis; No shortness of breath  CARDIOVASCULAR: No chest pain, palpitations, dizziness, or leg swelling  GASTROINTESTINAL: No abdominal or epigastric pain. No nausea, vomiting, or hematemesis; No diarrhea or constipation. No melena or hematochezia.  GENITOURINARY: No dysuria, frequency, hematuria, or incontinence  NEUROLOGICAL: No headaches, memory loss, loss of strength, numbness, or tremors  SKIN: No itching, burning, rashes, or lesions   LYMPH NODES: No enlarged glands  ENDOCRINE: No heat or cold intolerance; No hair loss  MUSCULOSKELETAL: b/l knee pian well controlled   PSYCHIATRIC: No depression, anxiety, mood swings, or difficulty sleeping  HEME/LYMPH: No easy bruising, or bleeding gums  ALLERGY AND IMMUNOLOGIC: No hives or eczema    Vital Signs Last 24 Hrs  T(C): 36.9 (21 Oct 2018 09:00), Max: 37.2 (20 Oct 2018 16:29)  T(F): 98.5 (21 Oct 2018 09:00), Max: 99 (20 Oct 2018 16:29)  HR: 70 (21 Oct 2018 09:00) (70 - 86)  BP: 105/63 (21 Oct 2018 09:00) (105/63 - 130/73)  BP(mean): --  RR: 19 (21 Oct 2018 09:00) (18 - 19)  SpO2: 95% (21 Oct 2018 09:00) (94% - 96%)  PHYSICAL EXAM:    GENERAL: NAD, well-groomed, well-developed  HEAD:  Atraumatic, Normocephalic  EYES: EOMI, PERRLA, conjunctiva and sclera clear  NECK: Supple, No JVD, Normal thyroid  NERVOUS SYSTEM:  Alert & Oriented X3, no focal deficit  CHEST/LUNG: CTA b/l ,  no  rales, rhonchi, wheezing, or rubs  HEART: Regular rate and rhythm; No murmurs, rubs, or gallops  ABDOMEN: Soft, Nontender, Nondistended; Bowel sounds present  EXTREMITIES:  2+ Peripheral Pulses, No clubbing, cyanosis, or edema , B/L knee dressing+ , clean and dry   LYMPH: No lymphadenopathy noted  SKIN: No rashes or lesions
Patient seen and examined . S/p B/L TKA  , POD # 3 . Patient c/o severe pain with physical therapy yesterday , discharge home was postponed for better pain control . Pain better controlled since yesterday as pain meds changed , no other complaints     CC : b/l knee pain better controlled today     MEDICATIONS  (STANDING):  acetaminophen   Tablet .. 975 milliGRAM(s) Oral every 8 hours  celecoxib 200 milliGRAM(s) Oral two times a day  cephalexin 500 milliGRAM(s) Oral four times a day  docusate sodium 100 milliGRAM(s) Oral three times a day  enoxaparin Injectable 30 milliGRAM(s) SubCutaneous every 12 hours  hydrochlorothiazide 25 milliGRAM(s) Oral daily  losartan 100 milliGRAM(s) Oral daily  oxyCODONE  ER Tablet 20 milliGRAM(s) Oral every 12 hours  polyethylene glycol 3350 17 Gram(s) Oral daily  sodium chloride 0.9% lock flush 3 milliLiter(s) IV Push every 8 hours  sodium chloride 0.9% lock flush 3 milliLiter(s) IV Push every 8 hours  tranexamic acid IVPB 1250 milliGRAM(s) IV Intermittent once  tranexamic acid IVPB 1250 milliGRAM(s) IV Intermittent once    MEDICATIONS  (PRN):  aluminum hydroxide/magnesium hydroxide/simethicone Suspension 30 milliLiter(s) Oral four times a day PRN Indigestion  benzocaine 15 mG/menthol 3.6 mG Lozenge 1 Lozenge Oral every 4 hours PRN Sore Throat  bisacodyl Suppository 10 milliGRAM(s) Rectal daily PRN If no bowel movement by postoperative day #2  HYDROmorphone   Tablet 4 milliGRAM(s) Oral every 3 hours PRN Severe Pain (7 - 10)  HYDROmorphone   Tablet 2 milliGRAM(s) Oral every 3 hours PRN Moderate Pain (4 - 6)  HYDROmorphone  Injectable 0.5 milliGRAM(s) IV Push every 3 hours PRN Breakthrough Pain  magnesium hydroxide Suspension 30 milliLiter(s) Oral daily PRN Constipation  ondansetron Injectable 4 milliGRAM(s) IV Push every 6 hours PRN Nausea and/or Vomiting  senna 2 Tablet(s) Oral at bedtime PRN Constipation      LABS:                          12.1   13.7  )-----------( 203      ( 21 Oct 2018 06:17 )             36.4     10-21    139  |  100  |  22.0<H>  ----------------------------<  120<H>  4.1   |  26.0  |  1.38<H>    Ca    8.1<L>      21 Oct 2018 06:17            REVIEW OF SYSTEMS:    CONSTITUTIONAL: No fever, weight loss, or fatigue  EYES: No eye pain, visual disturbances, or discharge  ENMT:  No difficulty hearing, tinnitus, vertigo; No sinus or throat pain  NECK: No pain or stiffness  RESPIRATORY: No cough, wheezing, chills or hemoptysis; No shortness of breath  CARDIOVASCULAR: No chest pain, palpitations, dizziness, or leg swelling  GASTROINTESTINAL: No abdominal or epigastric pain. No nausea, vomiting, or hematemesis; No diarrhea or constipation. No melena or hematochezia.  GENITOURINARY: No dysuria, frequency, hematuria, or incontinence  NEUROLOGICAL: No headaches, memory loss, loss of strength, numbness, or tremors  SKIN: No itching, burning, rashes, or lesions   LYMPH NODES: No enlarged glands  ENDOCRINE: No heat or cold intolerance; No hair loss  MUSCULOSKELETAL: B/L knee pain well controlled with prescribed meds   PSYCHIATRIC: No depression, anxiety, mood swings, or difficulty sleeping  HEME/LYMPH: No easy bruising, or bleeding gums  ALLERGY AND IMMUNOLOGIC: No hives or eczema    Vital Signs Last 24 Hrs  T(C): 36.6 (22 Oct 2018 07:49), Max: 37.7 (22 Oct 2018 00:42)  T(F): 97.9 (22 Oct 2018 07:49), Max: 99.8 (22 Oct 2018 00:42)  HR: 88 (22 Oct 2018 07:49) (75 - 88)  BP: 135/80 (22 Oct 2018 07:49) (110/69 - 135/80)  BP(mean): --  RR: 18 (22 Oct 2018 07:49) (18 - 18)  SpO2: 96% (22 Oct 2018 07:49) (94% - 97%)  PHYSICAL EXAM:    GENERAL: NAD, well-groomed, well-developed  HEAD:  Atraumatic, Normocephalic  EYES: EOMI, PERRLA, conjunctiva and sclera clear  NECK: Supple, No JVD, Normal thyroid  NERVOUS SYSTEM:  Alert & Oriented X3, no focal deficit  CHEST/LUNG: CTA b/l ,  no  rales, rhonchi, wheezing, or rubs  HEART: Regular rate and rhythm; No murmurs, rubs, or gallops  ABDOMEN: Soft, Nontender, Nondistended; Bowel sounds present  EXTREMITIES:  2+ Peripheral Pulses, No clubbing, cyanosis, or edema , b/l knee dressing+ , clean and dry   LYMPH: No lymphadenopathy noted  SKIN: No rashes or lesions

## 2018-10-22 NOTE — PROGRESS NOTE ADULT - ASSESSMENT
57 year old male who states that he had B/L knee pain for the last 17 years which progressively worsened.  Had cortisone inj in the past and was taking Aleve and Meloxicam with some relief , pain was getting worst lately . Now he is s/p B/L TKA , POD # 3 . Pain better  controlled , no other complaints .        Problem/Recommendation - 1:  Problem: Primary osteoarthritis of both knees. Recommendation: s/p b/l  TKA      Problem/Recommendation - 2:  ·  Problem: Status post bilateral knee replacements.  Recommendation: PT/OT/pain mgmt  DVT prophylaxis- as per ortho  Abx as per SCIP  Incentive spirometry  Prophylaxis of opioid  induced constipation.      Problem/Recommendation - 3:  ·  Problem: Essential hypertension.  Recommendation: continue home meds with parameters. Patient advised to hold diuretics for 2 days      Problem/Recommendation - 4:  ·  Problem: Leukocytosis, unspecified type.  Recommendation: - no S/AS of infection - likely reactive - resolving      Problem/Recommendation - 5:  ·  Problem: Acute blood loss as cause of postoperative anemia.  Recommendation: asymptomatic.      Problem/Recommendation - 6:  Problem: Need for prophylactic measure. Recommendation: DVT prophylaxis  - as per ortho protocol - on Lovenox   Opioid induced constipation  prophylaxis - bowel regimen     Problem / Plan - Hypokalemia - supplemented     Problem / Plan - Decreased eGFR - likely pre renal azotemia - patient advised to hold on diuretics for 2 days and to increase PO fluid intake .   Medically stable to d/c once cleared by physical therapy / ortho .

## 2018-10-26 ENCOUNTER — RX RENEWAL (OUTPATIENT)
Age: 58
End: 2018-10-26

## 2018-10-31 PROBLEM — E66.9 OBESITY, UNSPECIFIED: Chronic | Status: ACTIVE | Noted: 2018-10-01

## 2018-11-01 ENCOUNTER — OTHER (OUTPATIENT)
Age: 58
End: 2018-11-01

## 2018-11-02 ENCOUNTER — OTHER (OUTPATIENT)
Age: 58
End: 2018-11-02

## 2018-11-05 ENCOUNTER — OTHER (OUTPATIENT)
Age: 58
End: 2018-11-05

## 2018-11-05 ENCOUNTER — OUTPATIENT (OUTPATIENT)
Dept: OUTPATIENT SERVICES | Facility: HOSPITAL | Age: 58
LOS: 1 days | End: 2018-11-05
Payer: COMMERCIAL

## 2018-11-05 DIAGNOSIS — Z96.653 PRESENCE OF ARTIFICIAL KNEE JOINT, BILATERAL: ICD-10-CM

## 2018-11-05 DIAGNOSIS — Z51.89 ENCOUNTER FOR OTHER SPECIFIED AFTERCARE: ICD-10-CM

## 2018-11-05 DIAGNOSIS — Z98.890 OTHER SPECIFIED POSTPROCEDURAL STATES: Chronic | ICD-10-CM

## 2018-11-08 ENCOUNTER — APPOINTMENT (OUTPATIENT)
Dept: ORTHOPEDIC SURGERY | Facility: CLINIC | Age: 58
End: 2018-11-08
Payer: COMMERCIAL

## 2018-11-08 VITALS
DIASTOLIC BLOOD PRESSURE: 70 MMHG | SYSTOLIC BLOOD PRESSURE: 105 MMHG | WEIGHT: 260 LBS | HEART RATE: 99 BPM | HEIGHT: 68 IN | BODY MASS INDEX: 39.4 KG/M2

## 2018-11-08 PROCEDURE — 73562 X-RAY EXAM OF KNEE 3: CPT | Mod: 50

## 2018-11-08 PROCEDURE — 99024 POSTOP FOLLOW-UP VISIT: CPT

## 2018-11-08 RX ORDER — MELOXICAM 7.5 MG/1
7.5 TABLET ORAL TWICE DAILY
Qty: 60 | Refills: 0 | Status: DISCONTINUED | COMMUNITY
Start: 2018-07-31 | End: 2018-11-08

## 2018-11-08 RX ORDER — HYDROMORPHONE HYDROCHLORIDE 2 MG/1
2 TABLET ORAL
Qty: 56 | Refills: 0 | Status: DISCONTINUED | COMMUNITY
Start: 2018-10-26 | End: 2018-11-08

## 2018-11-13 ENCOUNTER — OTHER (OUTPATIENT)
Age: 58
End: 2018-11-13

## 2018-11-17 ENCOUNTER — MOBILE ON CALL (OUTPATIENT)
Age: 58
End: 2018-11-17

## 2018-11-17 RX ORDER — HYDROMORPHONE HYDROCHLORIDE 2 MG/ML
1 INJECTION INTRAMUSCULAR; INTRAVENOUS; SUBCUTANEOUS
Qty: 23 | Refills: 0 | OUTPATIENT
Start: 2018-11-17

## 2018-11-19 ENCOUNTER — OTHER (OUTPATIENT)
Age: 58
End: 2018-11-19

## 2018-11-23 ENCOUNTER — MOBILE ON CALL (OUTPATIENT)
Age: 58
End: 2018-11-23

## 2018-11-23 RX ORDER — HYDROMORPHONE HYDROCHLORIDE 2 MG/ML
1 INJECTION INTRAMUSCULAR; INTRAVENOUS; SUBCUTANEOUS
Qty: 20 | Refills: 0 | OUTPATIENT
Start: 2018-11-23

## 2018-11-30 ENCOUNTER — APPOINTMENT (OUTPATIENT)
Dept: ORTHOPEDIC SURGERY | Facility: CLINIC | Age: 58
End: 2018-11-30

## 2018-12-03 ENCOUNTER — OTHER (OUTPATIENT)
Age: 58
End: 2018-12-03

## 2018-12-10 ENCOUNTER — APPOINTMENT (OUTPATIENT)
Dept: ORTHOPEDIC SURGERY | Facility: CLINIC | Age: 58
End: 2018-12-10
Payer: COMMERCIAL

## 2018-12-10 VITALS
BODY MASS INDEX: 39.4 KG/M2 | HEART RATE: 80 BPM | DIASTOLIC BLOOD PRESSURE: 83 MMHG | WEIGHT: 260 LBS | HEIGHT: 68 IN | SYSTOLIC BLOOD PRESSURE: 128 MMHG

## 2018-12-10 PROCEDURE — 73562 X-RAY EXAM OF KNEE 3: CPT | Mod: 50

## 2018-12-10 PROCEDURE — 99024 POSTOP FOLLOW-UP VISIT: CPT

## 2018-12-10 RX ORDER — HYDROMORPHONE HYDROCHLORIDE 2 MG/1
2 TABLET ORAL EVERY 6 HOURS
Qty: 30 | Refills: 0 | Status: DISCONTINUED | COMMUNITY
Start: 2018-11-02 | End: 2018-12-10

## 2018-12-10 RX ORDER — CELECOXIB 200 MG/1
200 CAPSULE ORAL TWICE DAILY
Qty: 60 | Refills: 0 | Status: DISCONTINUED | COMMUNITY
Start: 2018-11-13 | End: 2018-12-10

## 2018-12-10 RX ORDER — ASPIRIN 325 MG/1
325 TABLET, COATED ORAL
Refills: 0 | Status: DISCONTINUED | COMMUNITY
End: 2018-12-10

## 2018-12-10 RX ORDER — CELECOXIB 200 MG/1
200 CAPSULE ORAL
Refills: 0 | Status: DISCONTINUED | COMMUNITY
End: 2018-12-10

## 2018-12-10 RX ORDER — SENNOSIDES 8.6 MG/1
CAPSULE, GELATIN COATED ORAL
Refills: 0 | Status: DISCONTINUED | COMMUNITY
End: 2018-12-10

## 2018-12-20 PROCEDURE — 97010 HOT OR COLD PACKS THERAPY: CPT

## 2018-12-20 PROCEDURE — 97110 THERAPEUTIC EXERCISES: CPT

## 2018-12-20 PROCEDURE — 97162 PT EVAL MOD COMPLEX 30 MIN: CPT

## 2018-12-20 PROCEDURE — 97140 MANUAL THERAPY 1/> REGIONS: CPT

## 2019-01-05 ENCOUNTER — OTHER (OUTPATIENT)
Age: 59
End: 2019-01-05

## 2019-01-07 ENCOUNTER — OTHER (OUTPATIENT)
Age: 59
End: 2019-01-07

## 2019-01-14 ENCOUNTER — APPOINTMENT (OUTPATIENT)
Dept: ORTHOPEDIC SURGERY | Facility: CLINIC | Age: 59
End: 2019-01-14
Payer: COMMERCIAL

## 2019-01-14 VITALS
DIASTOLIC BLOOD PRESSURE: 80 MMHG | BODY MASS INDEX: 39.4 KG/M2 | HEIGHT: 68 IN | SYSTOLIC BLOOD PRESSURE: 120 MMHG | WEIGHT: 260 LBS | HEART RATE: 80 BPM

## 2019-01-14 DIAGNOSIS — Z96.653 AFTERCARE FOLLOWING JOINT REPLACEMENT SURGERY: ICD-10-CM

## 2019-01-14 DIAGNOSIS — Z47.1 AFTERCARE FOLLOWING JOINT REPLACEMENT SURGERY: ICD-10-CM

## 2019-01-14 DIAGNOSIS — Z96.659 PRESENCE OF UNSPECIFIED ARTIFICIAL KNEE JOINT: ICD-10-CM

## 2019-01-14 PROCEDURE — 99024 POSTOP FOLLOW-UP VISIT: CPT

## 2019-01-14 PROCEDURE — 73562 X-RAY EXAM OF KNEE 3: CPT | Mod: 50

## 2019-01-14 RX ORDER — CELECOXIB 200 MG/1
200 CAPSULE ORAL TWICE DAILY
Qty: 60 | Refills: 0 | Status: DISCONTINUED | COMMUNITY
Start: 2018-12-10 | End: 2019-01-14

## 2019-01-14 RX ORDER — HYDROMORPHONE HYDROCHLORIDE 2 MG/1
2 TABLET ORAL
Qty: 56 | Refills: 0 | Status: DISCONTINUED | COMMUNITY
Start: 2018-11-08 | End: 2019-01-14

## 2019-01-14 NOTE — HISTORY OF PRESENT ILLNESS
[Doing Well] : is doing well [Excellent Pain Control] : has excellent pain control [No Sign of Infection] : is showing no signs of infection [Chills] : no chills [Constipation] : no constipation [Diarrhea] : no diarrhea [Dysuria] : no dysuria [Fever] : no fever [Nausea] : no nausea [Vomiting] : no vomiting [Erythema] : not erythematous [Discharge] : absent of discharge [Dehiscence] : not dehisced [de-identified] : S/P Bilateral computer-assisted TKR, DOS: 10/19/18.  [de-identified] : The patient is a 58 year old male 12 weeks s/p bilateral TKR. He is ambulating and transferring well without assistive devices. He is back at work and reports being able to reciprocate stairs. He reports mild stiffness and tenderness which reduces over the course of the day. He has concluded physical therapy at this time. Overall, he is very happy with the results of the surgery.  [de-identified] : Exam of the left knee shows a well healed incision. full extension, no instability, flexion of 125 degrees measured with a goniometer. \par Exam of the right knee shows a well healed incision. Full extension, no instability, flexion of 124 degrees measured with a goniometer. 5/5 motor strength bilaterally distally. Sensation intact distally.  [de-identified] : Xray- 3 views of the left knee - well aligned and well fixed left knee replacement. \par  Xray- 3 views of the right knee - well aligned and well fixed right knee replacement.  [de-identified] : The patient is a 58 year old male 12 weeks s/p bilateral TKR. He will continue home exercises to strengthen.. Overall, he is very happy with the results of the surgery. Dental prophylaxis was advised. Follow up one year post op for radiographic surveillance.

## 2019-01-14 NOTE — ADDENDUM
[FreeTextEntry1] : This note was authored by Michael Chaidez working as a medical scribe for Dr. Ab Lott. The note was reviewed, edited, and revised by Dr. Ab Lott whom is in agreement with the exam findings, imaging findings, and treatment plan. 01/14/2019.

## 2019-01-17 ENCOUNTER — FORM ENCOUNTER (OUTPATIENT)
Age: 59
End: 2019-01-17

## 2019-04-07 ENCOUNTER — TRANSCRIPTION ENCOUNTER (OUTPATIENT)
Age: 59
End: 2019-04-07

## 2019-06-29 ENCOUNTER — TRANSCRIPTION ENCOUNTER (OUTPATIENT)
Age: 59
End: 2019-06-29

## 2019-10-23 NOTE — H&P PST ADULT - ASSESSMENT
medications reviewed, instructions given on what medications to take and what not to take. Asked the patient to take the Blood pressure medication/ heart medication on DOP.   CAPRINI SCORE [CLOT]    AGE RELATED RISK FACTORS                                                       MOBILITY RELATED FACTORS  [ x] Age 41-60 years                                            (1 Point)                  [ ] Bed rest                                                        (1 Point)  [ ] Age: 61-74 years                                           (2 Points)                 [ ] Plaster cast                                                   (2 Points)  [ ] Age= 75 years                                              (3 Points)                 [ ] Bed bound for more than 72 hours                 (2 Points)    DISEASE RELATED RISK FACTORS                                               GENDER SPECIFIC FACTORS  [ ] Edema in the lower extremities                       (1 Point)                  [ ] Pregnancy                                                     (1 Point)  [ ] Varicose veins                                               (1 Point)                  [ ] Post-partum < 6 weeks                                   (1 Point)             [x ] BMI > 25 Kg/m2                                            (1 Point)                  [ ] Hormonal therapy  or oral contraception          (1 Point)                 [ ] Sepsis (in the previous month)                        (1 Point)                  [ ] History of pregnancy complications                 (1 point)  [ ] Pneumonia or serious lung disease                                               [ ] Unexplained or recurrent                     (1 Point)           (in the previous month)                               (1 Point)  [ ] Abnormal pulmonary function test                     (1 Point)                 SURGERY RELATED RISK FACTORS  [ ] Acute myocardial infarction                              (1 Point)                 [ ]  Section                                             (1 Point)  [ ] Congestive heart failure (in the previous month)  (1 Point)               [ ] Minor surgery                                                  (1 Point)   [ ] Inflammatory bowel disease                             (1 Point)                 [ ] Arthroscopic surgery                                        (2 Points)  [ ] Central venous access                                      (2 Points)                [ ] General surgery lasting more than 45 minutes   (2 Points)       [ ] Stroke (in the previous month)                          (5 Points)               [x ] Elective arthroplasty                                         (5 Points)                                                                                                                                               HEMATOLOGY RELATED FACTORS                                                 TRAUMA RELATED RISK FACTORS  [ ] Prior episodes of VTE                                     (3 Points)                 [ ] Fracture of the hip, pelvis, or leg                       (5 Points)  [ ] Positive family history for VTE                         (3 Points)                 [ ] Acute spinal cord injury (in the previous month)  (5 Points)  [ ] Prothrombin 10078 A                                     (3 Points)                 [ ] Paralysis  (less than 1 month)                             (5 Points)  [ ] Factor V Leiden                                             (3 Points)                  [ ] Multiple Trauma within 1 month                        (5 Points)  [ ] Lupus anticoagulants                                     (3 Points)                                                           [ ] Anticardiolipin antibodies                               (3 Points)                                                       [ ] High homocysteine in the blood                      (3 Points)                                             [ ] Other congenital or acquired thrombophilia      (3 Points)                                                [ ] Heparin induced thrombocytopenia                  (3 Points)                                          Total Score [    7      ] Donor Site Anesthesia Type: same as repair anesthesia

## 2020-01-10 ENCOUNTER — TRANSCRIPTION ENCOUNTER (OUTPATIENT)
Age: 60
End: 2020-01-10

## 2020-04-17 NOTE — PATIENT PROFILE ADULT. - PRO MENTAL HEALTH SX RECENT
Left Message: If the pharmacy says the triamcinolone is to expensive it's $4 dollar for a 80 gm tube at [facility]. She can have the pharmacy transfer it- If patient has any questions she is to call the office.    
Patient called in she said she just went to the pharmacy but the clobetasol was too expensive , is there anything else that could be prescribed ? Also she asked if she could use just a triple antibiotic cream ?    Please advise .    Thank you   
Sent generic vaniqa for unwanted hair growth, use goodrx. Eyecreams for undereye circles --look for active ingredients caffeine, hyaluronic acid, make sure hydrating and getting enough sleep  
none

## 2020-07-31 NOTE — PROGRESS NOTE ADULT - REASON FOR ADMISSION
HOSPITALIST ADMISSION HISTORY AND PHYSICAL    Patient: Carmela Martines Date: 2020   : 1929 Attending: iGl Spencer MD   89 year old female        Chief Complaint:   Chief Complaint   Patient presents with   • Altered Mental Status   • Hip Pain       History of Present Illness:   Carmela Martines is a 89 year old female with a PMH significant for diastolic heart failure, CAD s/p MI, HTN, COPD, pulmonary HTN, chronic respiratory failure on home O2 at night, GERD, CKDIII, urinary incontinence, MDS, anemia, iron overload, hypothyroidism, RLS, gout, DJD/OA, osteoporosis, chronic pain, anxiety, depression, insomnia, obesity that presents with the above chief complaint.    Per ED:  \"Carmela Martines is a 89 year old female who presents to the ED from assisted living for evaluation of AMS that began today. The pt's neighbor at the assisted living facility called 911 after noticing that the pt had AMS. The pt was not feeling well for a couple days. She has also been complaining of right hip pain for the past 3 weeks. The pt also reports feeling \"hot\". The pt's pulse ox was 75% as per EMS. In the ER, the pt's pulse ox is 85% on room air. The pt uses oxygen at night only. There are no further complaints or modifying factors at this time.\"    Per patient:  She states her neighbor noted that she was not responding and called EMS. Per patient, she was in fact responding and was not altered. The patient does note right leg pain for 5 days. The pain is located in the right upper medial thigh area but radiates to her outer thigh. The pain has been constant and worsening since onset. She describes it as 15/10 burning pain. Oxycodone helps but she has had to take it more frequently than prescribed. She was taking it every 4 hours instead of every 6 hours. She has tried heat without some benefit but it gave her blisters. The pain is worse with movement. She has associated right leg and foot weakness. 
States she has to drag her leg around and that it just won't work. She has had trouble ambulating as a consequence and has stubbed her toes causing bruising. She has no urinary or bowel incontinence and no saddle anesthesia. She has chronic bilateral LE edema but denies any erythema, fevers, or chills.    Additionally, she told nurse that she did not wear her home oxygen last night.     On chart review:  Patient seen by PCP yesterday for left leg pain and bilateral LE edema thought to be secondary to acute on chronic diastolic heart failure. At that appointment, it was noted she recently increased lasix dosing and then was started on metolazone at that appointment. She was also started on prednisone taper and lyrica in addition to her oxycodone. Patient had not yet started metolazone, prednisone, and lyrica prior to ED visit.     ED course:  Vitals: Afebrile, normal HR, normal BP, mild tachypnea, O2 sats 78-85% on RA, improved to 94-99% on 2L O2  Labs: K of 6.8, Cr 2.26 (previously 0.92-1.12), BUN 34, mildly elevated AST at 57, low albumin, low Ca, elevated trop of 0.11 on POCT, leukocytosis of 24.1 (b/l normal), hgb of 7 (b/l ~8). Lactate normal. Blood cx pending. UA negative. COVID pending.   Imaging: CXR with no acute findings. CT right lower extremity moderate amount of circumferential soft tissue edema, inflammation, or  potentially cellulitis at the level of the ankle and distal lower leg  without drainable fluid collection. There is no soft tissue gas identified.  EKG: NSR, IRBB, prolonged QT unchanged from previous  Interventions: tylenol, ceftriaxone, clindamycin, vancomycin, D50, insulin, 500cc LR bolus, O2    Review of  Systems:  No other associated signs and symptoms except as noted above.  Additionally, the patient complains of chronic LE edema, carpal tunnel syndrome  GENERAL:  Patient denies lightheadedness, dizziness, fever, chills, night sweats, weight loss.  EYES:  Patient denies changes to vision, 
eye pain, eye discharge.  ENT/MOUTH:  Patient denies changes to hearing, ear pain, ear discharge, runny nose, congestion, sores in mouth, sore throat, trouble swallowing.  LYMPH: Patient denies swollen glands around the neck, arm pit, or groin  CARDIOVASCULAR:  Patient denies chest pain, chest pressure, palpitations, orthopnea, paroxysmal nocturnal dyspnea.  RESPIRATORY:  Patient denies cough, wheezing, shortness of breath.   GASTROINTESTINAL:  Patient denies abdominal pain, nausea/vomiting, diarrhea, constipation, blood in stool.  :  Patient denies painful urination, blood in urine.  MUSCULOSKELETAL: See above  NEUROLOGIC:  See above  SKIN:  See above  ENDOCRINE:  Patient denies excessive thirst, excessive urination, heat intolerance, cold intolerance.  HEME: Patient denies bleeding problems.   PSYCH: Patient denies anxiety and depression.    Past Medical History  Past Medical History:   Diagnosis Date   • Anemia    • Anxiety    • CAD (coronary artery disease)     med management, cath 2010   • Cholelithiasis    • CKD (chronic kidney disease)     Stage III   • COPD (chronic obstructive pulmonary disease) (CMS/[facility])    • Coronary atherosclerosis of native coronary artery 11/16/2012   • DA (degenerative arthritis)     severe djd b/l knees   • Depression    • Diastolic heart failure (CMS/[facility]) 10/28/2016   • Esophageal reflux    • Essential (primary) hypertension     Followed by Dr [facility] [phone]   • Fracture     Right ankle and left wrist  No surgery   • Full dentures    • Gout    • Hiatal hernia    • History of home oxygen therapy 2L O2 OXYGEN ONE    Followed by Dr [facility] [phone]   • History of tuberculosis exposure     as a child   • Hyperlipidemia    • Hypertension    • Hypothyroid    • Hypoxia     Followed by Dr [facility] [phone]   • Impaired mobility     Uses  
s/p Bilateral TKA
Ruperto    • Insomnia    • Myelodysplasia 06/2010    increased ring sideroblasts/normal cytogenic profile   • Myelodysplasia, low grade (CMS/HCC) 4/1/2012   • Myocardial infarction (CMS/[facility])     Patient denies. States [place] and had an EKG done and they said she once had a silent MI. No testing since.    • Obesity (BMI 30-39.9) 4/16/2015   • Osteoporosis    • Other chronic pain     Arthritis   • Other dyspnea and respiratory abnormality 11/16/2012    Followed by Dr [facility] [phone]   • Pneumonia    • Urinary incontinence     urge incontinence at times   • Urinary tract infection    • Vitamin B12 deficiency    • Vitamin D deficiency    • Wears glasses      Patient Active Problem List    Diagnosis Date Noted   • Essential hypertension, benign 04/16/2013     Priority: Medium   • Acute respiratory failure with hypoxia (CMS/HCC) 07/31/2020     Priority: Low   • Hyperkalemia 07/31/2020     Priority: Low   • Acute renal failure superimposed on stage 3 chronic kidney disease (CMS/HCC) 07/31/2020     Priority: Low   • Elevated AST (SGOT) 07/31/2020     Priority: Low   • Elevated troponin 07/31/2020     Priority: Low   • Leukocytosis 07/31/2020     Priority: Low   • Anemia 07/31/2020     Priority: Low   • Suspected COVID-19 virus infection 07/31/2020     Priority: Low   • Sepsis (CMS/HCC) 07/31/2020     Priority: Low   • Pulmonary hypertension (CMS/HCC) 02/22/2018     Priority: Low   • Nocturnal hypoxemia 11/02/2017     Priority: Low     O2 with Oxygen One -3L at [facility] per Dr Hollingsworth-no auxiliary tank -2/22/18    [facility] ; 02 2 LPM AT  based on overnight pulse oxi room  Air Dec 2017 (fg)      • Osteoarthritis 07/19/2017     Priority: Low   • Diastolic heart failure (CMS/HCC) 10/28/2016     Priority: Low   • Iron overload due to repeated red blood cell transfusions 08/29/2016     Priority: Low   • Cataract 08/16/2016     Priority: Low   • RLS (restless legs 
b/l knee pain / OA / S/P b/l  TKA
b/l knee pain / OA / s/p elective B/L TKA
syndrome) 07/22/2016     Priority: Low   • Myelodysplastic syndrome (CMS/HCC) 03/07/2016     Priority: Low     New diagnosis entered per Dr Derrick matta.     • Cervical radicular pain 01/31/2014     Priority: Low   • HLD (hyperlipidemia) 04/16/2013     Priority: Low   • Neutropenia (CMS/HCC) 03/18/2013     Priority: Low   • CKD (chronic kidney disease), stage III (CMS/HCC) 03/12/2013     Priority: Low   • Coronary atherosclerosis of native coronary artery 11/16/2012     Priority: Low   • Myelodysplasia, low grade (CMS/HCC) 04/01/2012     Priority: Low     Reviewed and updated.    Past Surgical History  Past Surgical History:   Procedure Laterality Date   • Appendectomy  age 42   • Back surgery  > 20years    Cervical fusion   • Eye surgery Right 7/12/2016    Cataract Extraction with IOL   • Hysterectomy  age 42    complete   • Knee arthroscopy w/ laser      Right   • Tonsillectomy and adenoidectomy       Reviewed and updated.    Family History  Family History   Problem Relation Age of Onset   • Cancer Mother         Breast   • Arthritis Mother    • Depression Mother    • Cancer Father         liver   • COPD Father    • Heart disease Father    • Stroke Father    • COPD Sister    • Arthritis Sister    • Cancer Sister    • Diabetes Daughter    • Asthma Other    • Diabetes Other    • Stroke/TIA Other      Reviewed and updated. Family hx noncontributory.    Social History  Social History     Socioeconomic History   • Marital status:      Spouse name: Not on file   • Number of children: Not on file   • Years of education: Not on file   • Highest education level: Not on file   Occupational History   • Not on file   Social Needs   • Financial resource strain: Not on file   • Food insecurity:     Worry: Not on file     Inability: Not on file   • Transportation needs:     Medical: Not on file     Non-medical: Not on file   Tobacco Use   • Smoking status: Never Smoker   • Smokeless tobacco: Never Used   Substance and 
Sexual Activity   • Alcohol use: No     Alcohol/week: 0.0 standard drinks     Frequency: Never     Drinks per session: 1 or 2     Binge frequency: Never   • Drug use: No   • Sexual activity: Not Currently   Lifestyle   • Physical activity:     Days per week: Not on file     Minutes per session: Not on file   • Stress: Not on file   Relationships   • Social connections:     Talks on phone: Not on file     Gets together: Not on file     Attends Orthodoxy service: Not on file     Active member of club or organization: Not on file     Attends meetings of clubs or organizations: Not on file     Relationship status: Not on file   • Intimate partner violence:     Fear of current or ex partner: Not on file     Emotionally abused: Not on file     Physically abused: Not on file     Forced sexual activity: Not on file   Other Topics Concern   •  Service Not Asked   • Blood Transfusions Not Asked   • Caffeine Concern Not Asked   • Occupational Exposure Not Asked   • Hobby Hazards Not Asked   • Sleep Concern Not Asked   • Stress Concern Not Asked   • Weight Concern Not Asked   • Special Diet Not Asked   • Back Care Not Asked   • Exercise Not Asked   • Bike Helmet Not Asked   • Seat Belt Yes   • Self-Exams Not Asked   Social History Narrative   • Not on file     The patient lives in [place] by herself in a senior living community. The patient is ambulatory with a walker. The patient is independent with ADLs and most iADLs. The patient denies tobacco, EtOH, and illicit drug use.    Reviewed and updated.    Medications  Medications Prior to Admission   Medication Sig Dispense Refill   • furosemide (LASIX) 40 MG tablet Take 1 tablet by mouth 2 times daily. 180 tablet 1   • pravastatin (PRAVACHOL) 10 MG tablet TAKE 0.5 TABLET BY MOUTH DAILY 90 tablet 3   • oxyCODONE-acetaminophen (PERCOCET) 5-325 MG per tablet Take 1 tablet by mouth every 6 hours as needed for Pain. May take 2 tabs every 6 hours as needed for new pain 60 
tablet 0   • allopurinol (ZYLOPRIM) 100 MG tablet TAKE 1 TABLET BY MOUTH DAILY. 90 tablet 3   • losartan (COZAAR) 50 MG tablet Take 1 tablet by mouth daily. 90 tablet 1   • pramipexole (MIRAPEX) 0.5 MG tablet TAKE 1 TABLET BY MOUTH TWICE DAILY 180 tablet 2   • gabapentin (NEURONTIN) 300 MG capsule Take 2 capsules by mouth at bedtime. 180 capsule 1   • levothyroxine (SYNTHROID, LEVOTHROID) 100 MCG tablet TAKE 1 TABLET BY MOUTH DAILY 90 tablet 3   • pantoprazole (PROTONIX) 40 MG tablet Take 1 tablet by mouth daily. 90 tablet 3   • aspirin (ASPIR-81) 81 MG EC tablet Take 1 tablet by mouth daily.     • pregabalin (LYRICA) 25 MG capsule Take 1 capsule by mouth 2 times daily. 60 capsule 3   • predniSONE (DELTASONE) 10 MG tablet Take three tablets daily for 3 days, two tablets daily for 3 days, one tablet daily for 3 days 18 tablet 0   • [DISCONTINUED] metOLazone (ZAROXOLYN) 2.5 MG tablet TAKE 1 TABLET BY MOUTH EVERY FRIDAY AND TUESDAY MORNING 25 tablet 0      Reviewed and updated.    Allergies:  ALLERGIES:   Allergen Reactions   • Morphine Other (See Comments)     Altered mental status. Aiyana.   • Atorvastatin MYALGIA   • Iodine   (Environmental Or Med) GI UPSET     Entered into medical record because patient believed she was allergic to shellfish, but is not. Patient reports reaction to eating shellfish was upset stomach, denies rash or pruritis.    • Sulfa Antibiotics RASH and PRURITUS   • Tetanus Toxoid RASH and PRURITUS   • Vicodin [Hydrocodone-Acetaminophen] Other (See Comments)     Altered mental status, lethargy.      Reviewed and updated.      Vital 24 Hour Range Most Recent Value   Temperature Temp  Min: 98.4 °F (36.9 °C)  Max: 99.8 °F (37.7 °C) 98.4 °F (36.9 °C)   Pulse Pulse  Min: 68  Max: 75 73   Respiratory Resp  Min: 20  Max: 24 20   Blood Pressure BP  Min: 93/44  Max: 135/59 107/88   Pulse Oximetry SpO2  Min: 78 %  Max: 99 % 95 %   Art. BP No data recorded     O2 O2 Flow Rate (L/min)  Av L/min  Min: 2 
L/min   Min taken time: 07/31/20 1200  Max: 2 L/min   Max taken time: 07/31/20 1200       Vital Most Recent Value First Value   Weight 85.3 kg Weight: 82.7 kg   Height 5' 1\" (154.9 cm) Height: 5' 1\" (154.9 cm)   BMI 35.53 N/A       Physical Exam:  General - Patient is in in acute distress due to pain.  Patient is conversing freely in full sentences. Obese.  Head - Atraumatic, normocephalic  Eyes - Extra ocular muscles intact. Pupils, equal, round, and reactive to light. Sclerae are anicteric  ENT - No drainage from nasal mucosa or nares. Oropharyngeal mucous membranes are moist. Neck is soft and supple  LYMPHNODES - No cervical or supraclavicular lymphadenopathy   CV - Regular rate and rhythm without additional heartsounds. Peripheral pulses are 2+ and symmetric. Cap refill <2 seconds.  PULM - CTAB w/o wheezing, rhonchi or rales   ABDOMEN - Soft, non-tender and non-distended. Bowel sounds are normoactive. No guarding or rebound tenderness. No hepatosplenomegaly.  MUSCULOSKELETAL - Warm and well perfused. No cyanosis. Has bilateral 1+ LE edema. No appreciatble tenderness to palpation.   SKIN - Warm. 2 fluid filled blisters on right hip per picture below. Multiple areas of ill defined erythema on bilateral lower legs.   NEURO - CNs II-XII are grossly intact.  Strength, sensation, and coordination of LUE/RUE are grossly intact. Strength, sensation, and coordination of LLE/RLEs  PSYCH - Alert and oriented to person, place and time. Recent and remote memory intact.              Laboratory Results:  Recent Labs   Lab 07/31/20  0953 07/31/20  0910 07/31/20  0901   SODIUM  --   --  138   POTASSIUM  --   --  6.8*   CHLORIDE  --   --  103   CO2  --   --  27   GLUCOSE  --   --  108*   BUN  --   --  76*   CREATININE 2.50* 2.50* 2.26*   CALCIUM  --   --  7.8*   ALBUMIN  --   --  3.4*   BILIRUBIN  --   --  0.8   ALKPT  --   --  76   AST  --   --  57*   GPT  --   --  25   TOTPROTEIN  --   --  6.2*   INR  --   --  1.2   PTT  --   
--  30     No results found  Recent Labs   Lab 07/31/20  0901 07/29/20  0951   WBC 24.1* 5.9   RBC 1.95* 2.20*   HGB 7.0* 7.8*   HCT 22.5* 25.0*    247   .4* 113.6*     Recent Labs   Lab 07/31/20  0901   PTT 30     Urinalysis  Recent Labs   Lab 07/31/20  0841   USPG 1.017   UPH 5.0   UKET Negative   UPROT 30 *   UGLU Negative   UBILI Negative   UROB 2.0*   UWBC Negative   UNITR Negative   URBC Negative         Microbiology:  Specimen Description (no units)   Date Value   03/09/2017 URINE, CLEAN CATCH/MIDSTREAM   03/09/2017 BLOOD, PERIPHERAL R AC   03/09/2017 BLOOD, PERIPHERAL PORT   09/23/2016 URINE, CLEAN CATCH/MIDSTREAM      CULTURE (no units)   Date Value   03/09/2017     10,000 TO 50,000 CFU/mL MIXED BACTERIAL JUANA WITH NO PREDOMINATING TYPE   03/09/2017 NO GROWTH 5 DAYS.   03/09/2017 NO GROWTH 5 DAYS.   09/23/2016 (P)    60,000 ,000 CFU/mL CITROBACTER FREUNDII Low/mid level AmpC beta-lactamase detected. Repeat cultures and susceptibility testing at 3 to 5 days may be warranted if patient is not clinically responding.        I personally reviewed all recent labs      Imaging:  Xr Chest Ap Or Pa    Result Date: 7/31/2020  Narrative: EXAM: XR CHEST AP OR PA HISTORY:  Fever COMPARISON: 4/26/2017, 9/30/2019. FINDINGS: Stable cardiac size which appears to be mildly enlarged.   EKG leads overlie the chest. There is moderate aortic ectasia and calcification at the arch. PowerPort catheter is seen at least as far as the right atrium although its tip is not clearly identified. Clips are seen in the left axilla. There is marked elevation of the right hemidiaphragm to the mid chest level, unchanged No infiltrate or pleural fluid are seen.     Impression: IMPRESSION: No acute disease     Xr Lumbar Spine 2 Or 3 Views    Result Date: 7/29/2020  Narrative: XR LUMBAR SPINE 2 OR 3 VIEWS HISTORY: Pain in unspecified hip. COMPARISON: X-ray March 28, 2014 FINDINGS: Frontal, lateral, and lumbosacral views are 
provided.There are 5 nonrib-bearing lumbar type vertebral bodies present.There is no acute appearing fracture.There is facet arthropathy throughout the spine, with slight anterolisthesis L3/L4. There is severe disc degeneration T12/L1 and L4/L5 and mild to moderate height loss at the other levels.Bones are demineralized. Calcific plaque of aorta is visible. Right upper quadrant densities are likely cholelithiasis.     Impression: IMPRESSION: No acute-appearing osseous abnormality. Variable spondylosis. Right upper quadrant densities are probably cholelithiasis.     Xr Hip 2 View Bilateral And Pelvis    Result Date: 7/29/2020  Narrative: EXAM: XR HIP 2 VIEW BILATERAL AND PELVIS. HISTORY: Pain in unspecified hip. Hip pain for 2 weeks. COMPARISON: 03/08/2015. TECHNIQUE: AP view pelvis. 2 views both hips.     Impression: FINDINGS/IMPRESSION: Bony pelvis is radiographically grossly intact. Bilateral coxa profunda. Moderate arthritic changes both hips, SI joints, and pubic symphysis. Moderate to marked arthritic changes lower lumbar spine. Enthesophytes ischia and greater trochanters.  Demineralization. Vascular calcifications.       I personally reviewed all recent imaging.      Assessment:  Carmela Martines is a 89 year old female with PMH significant for diastolic heart failure, CAD s/p MI, HTN, COPD, pulmonary HTN, chronic respiratory failure on home O2 at night, GERD, CKDIII, urinary incontinence, MDS, anemia, iron overload, hypothyroidism, RLS, gout, DJD/OA, osteoporosis, chronic pain, anxiety, depression, insomnia, obesity who is admitted for:    Admission diagnoses    Sepsis (tachypnea, leukocytosis, likely source - cellulitis vs nec fasc)    Acute respiratory failure with hypoxia in setting of chronic nocturnal hypoxemia - 2/2 sepsis?    Hip pain - with leg weakness    Acute renal failure superimposed on stage 3 chronic kidney disease - pre-renal vs sepsis related vs overdiuresis    Hyperkalemia - 2/2 ARF    
Acute toxic metabolic encephalopathy - likely multifactorial due to the above vs med related    Elevated AST - 2/2 sepsis?    Elevated troponin - likely demand in setting of sepsis    Acute on chronic anemia - near baseline    Suspected COVID-19 virus infection    Other relevant diagnoses  Diastolic heart failure, CAD s/p MI, HTN, COPD, pulmonary HTN, chronic respiratory failure on home O2 at night, GERD, CKDIII, urinary incontinence, MDS, anemia, iron overload, hypothyroidism, RLS, gout, DJD/OA, osteoporosis, chronic pain, anxiety, depression, insomnia, obesity     Plan:  Admit to inpatient  Sepsis protocol  Continue abx started in ED  Noted CT with no evidence of neck fasc, possible cellulitis  Follow up cx and covid testing  If covid positive, start covid protocol  Continue O2, wean as tolerated, consider CT if not improving as CXR without acute process  Get stat CT of lumbar spine to eval for spinal or nerve root compression given right leg weakness and report foot drop, patient states unable to tolerate MRI which would be preferable but also limited due to need for MRI contrast in setting of acute renal failure  S/p 500cc bolus in ED, will continue gentle hydration (considering hx of diastolic heart failure), hold home diuretics and ARB  Trend Cr  Consult neph  S/p insulin, D50 in ED, will give calcium gluconate and kayexalate and trend K Trend glucose s/p insulin  Monitor mentation with the above, hold home percocet, gabapentin, and pramipexole for now  Trend AST  Trend Trop  Trend Hgb  Continue home meds as appropriate, except as noted above        Diet: Renal  DVT ppx: SubQ heparin  Code status: Full     Advance Care Planning Note    Does the patient have the capacity to make healthcare decisions?   Yes, the patient is able to receive, process, and then give feedback to information regarding medical discussions.     Does the patient have an Advance Care Directive document in Epic?  Yes, it is current and 
updated. It is the most recent version, signed and dated by the patient, and witnessed by two non-family/non-caregiver signatures.    After code status discussion, the patient has decided on: Full Resuscitation     Does Carmela Martines meet inclusion criteria?    Age greater than 80 maximal medical therapy      Gil Spencer MD  Hospitalist - Internal Medicine/Pediatrics  Pager - [phone]

## 2020-08-14 ENCOUNTER — TRANSCRIPTION ENCOUNTER (OUTPATIENT)
Age: 60
End: 2020-08-14

## 2020-08-20 ENCOUNTER — TRANSCRIPTION ENCOUNTER (OUTPATIENT)
Age: 60
End: 2020-08-20

## 2020-09-03 ENCOUNTER — TRANSCRIPTION ENCOUNTER (OUTPATIENT)
Age: 60
End: 2020-09-03

## 2020-11-14 ENCOUNTER — TRANSCRIPTION ENCOUNTER (OUTPATIENT)
Age: 60
End: 2020-11-14

## 2022-01-20 ENCOUNTER — TRANSCRIPTION ENCOUNTER (OUTPATIENT)
Age: 62
End: 2022-01-20

## 2022-06-22 ENCOUNTER — APPOINTMENT (OUTPATIENT)
Dept: SURGERY | Facility: CLINIC | Age: 62
End: 2022-06-22

## 2022-11-05 ENCOUNTER — NON-APPOINTMENT (OUTPATIENT)
Age: 62
End: 2022-11-05

## 2022-11-22 ENCOUNTER — NON-APPOINTMENT (OUTPATIENT)
Age: 62
End: 2022-11-22

## 2023-06-11 NOTE — H&P PST ADULT - EKG AND INTERPRETATION
Suggest to continue medications, monitoring, FU primary team recommendations.
EKG from 9/10/18 Normal sinus rhythm

## 2023-08-11 NOTE — CONSULT NOTE ADULT - PROBLEM SELECTOR PROBLEM 4
Leukocytosis, unspecified type Patient requests all Lab, Cardiology, and Radiology Results on their Discharge Instructions

## 2023-09-15 ASSESSMENT — KOOS JR
STRAIGHTENING KNEE FULLY: MILD
IMPORTED KOOS JR SCORE: 44.91
TWISING OR PIVOTING ON KNEE: MILD
TWISING OR PIVOTING ON KNEE: MILD
KOOS JR RAW SCORE: 17
KOOS JR RAW SCORE: 7
TWISING OR PIVOTING ON KNEE: MILD
IMPORTED KOOS JR SCORE: 76.33
STANDING UPRIGHT: MODERATE
IMPORTED KOOS JR SCORE: 59.38
IMPORTED KOOS JR SCORE: 50.01
BENDING TO THE FLOOR TO PICK UP OBJECT: MILD
BENDING TO THE FLOOR TO PICK UP OBJECT: MODERATE
BENDING TO THE FLOOR TO PICK UP OBJECT: SEVERE
GOING UP OR DOWN STAIRS: MILD
STANDING UPRIGHT: MILD
RISING FROM SITTING: MODERATE
TWISING OR PIVOTING ON KNEE: MODERATE
BENDING TO THE FLOOR TO PICK UP OBJECT: SEVERE
IMPORTED LATERALITY: LEFT
BENDING TO THE FLOOR TO PICK UP OBJECT: MILD
IMPORTED LATERALITY: RIGHT
TWISING OR PIVOTING ON KNEE: SEVERE
BENDING TO THE FLOOR TO PICK UP OBJECT: MILD
GOING UP OR DOWN STAIRS: MILD
STANDING UPRIGHT: MILD
RISING FROM SITTING: MODERATE
BENDING TO THE FLOOR TO PICK UP OBJECT: MODERATE
KOOS JR RAW SCORE: 7
IMPORTED KOOS JR SCORE: 44.91
IMPORTED KOOS JR SCORE: 68.28
KOOS JR RAW SCORE: 2
GOING UP OR DOWN STAIRS: MILD
KOOS JR RAW SCORE: 15
IMPORTED LATERALITY: LEFT
KOOS JR RAW SCORE: 4
IMPORTED KOOS JR SCORE: 79.91
IMPORTED KOOS JR SCORE: 44.91
IMPORTED FORM: YES
KOOS JR RAW SCORE: 4
HOW SEVERE IS YOUR KNEE STIFFNESS AFTER FIRST WAKING IN MORNING: SEVERE
IMPORTED KOOS JR SCORE: 50.01
RISING FROM SITTING: MILD
KOOS JR RAW SCORE: 7
STRAIGHTENING KNEE FULLY: MODERATE
KOOS JR RAW SCORE: 7
BENDING TO THE FLOOR TO PICK UP OBJECT: MODERATE
GOING UP OR DOWN STAIRS: MILD
HOW SEVERE IS YOUR KNEE STIFFNESS AFTER FIRST WAKING IN MORNING: SEVERE
HOW SEVERE IS YOUR KNEE STIFFNESS AFTER FIRST WAKING IN MORNING: MILD
HOW SEVERE IS YOUR KNEE STIFFNESS AFTER FIRST WAKING IN MORNING: MODERATE
STRAIGHTENING KNEE FULLY: MILD
KOOS JR RAW SCORE: 17
HOW SEVERE IS YOUR KNEE STIFFNESS AFTER FIRST WAKING IN MORNING: MILD
BENDING TO THE FLOOR TO PICK UP OBJECT: SEVERE
TWISING OR PIVOTING ON KNEE: MODERATE
IMPORTED KOOS JR SCORE: 50.01
IMPORTED FORM: YES
STRAIGHTENING KNEE FULLY: MILD
IMPORTED KOOS JR SCORE: 76.33
IMPORTED KOOS JR SCORE: 68.28
BENDING TO THE FLOOR TO PICK UP OBJECT: MILD
RISING FROM SITTING: MODERATE
TWISING OR PIVOTING ON KNEE: SEVERE
KOOS JR RAW SCORE: 3
IMPORTED KOOS JR SCORE: 50.01
RISING FROM SITTING: MILD
GOING UP OR DOWN STAIRS: MODERATE
STANDING UPRIGHT: MILD
IMPORTED KOOS JR SCORE: 68.28
IMPORTED LATERALITY: RIGHT
IMPORTED FORM: YES
BENDING TO THE FLOOR TO PICK UP OBJECT: MODERATE
RISING FROM SITTING: MODERATE
GOING UP OR DOWN STAIRS: MODERATE
STANDING UPRIGHT: MODERATE
HOW SEVERE IS YOUR KNEE STIFFNESS AFTER FIRST WAKING IN MORNING: SEVERE
KOOS JR RAW SCORE: 17
IMPORTED KOOS JR SCORE: 79.91
RISING FROM SITTING: MILD
STANDING UPRIGHT: MODERATE
STRAIGHTENING KNEE FULLY: MODERATE
IMPORTED LATERALITY: RIGHT
KOOS JR RAW SCORE: 11
IMPORTED LATERALITY: RIGHT
KOOS JR RAW SCORE: 11
KOOS JR RAW SCORE: 15
HOW SEVERE IS YOUR KNEE STIFFNESS AFTER FIRST WAKING IN MORNING: MILD
STRAIGHTENING KNEE FULLY: MODERATE
IMPORTED FORM: YES
HOW SEVERE IS YOUR KNEE STIFFNESS AFTER FIRST WAKING IN MORNING: MILD
KOOS JR RAW SCORE: 17
IMPORTED LATERALITY: LEFT
STRAIGHTENING KNEE FULLY: MODERATE
GOING UP OR DOWN STAIRS: MODERATE
IMPORTED KOOS JR SCORE: 84.6
KOOS JR RAW SCORE: 3
TWISING OR PIVOTING ON KNEE: MODERATE
IMPORTED KOOS JR SCORE: 84.6
TWISING OR PIVOTING ON KNEE: SEVERE
STANDING UPRIGHT: MODERATE
IMPORTED LATERALITY: LEFT
IMPORTED KOOS JR SCORE: 68.28
HOW SEVERE IS YOUR KNEE STIFFNESS AFTER FIRST WAKING IN MORNING: SEVERE
IMPORTED KOOS JR SCORE: 44.91
HOW SEVERE IS YOUR KNEE STIFFNESS AFTER FIRST WAKING IN MORNING: MODERATE
STANDING UPRIGHT: MILD
TWISING OR PIVOTING ON KNEE: MODERATE
IMPORTED KOOS JR SCORE: 59.38
RISING FROM SITTING: MILD
GOING UP OR DOWN STAIRS: MODERATE
TWISING OR PIVOTING ON KNEE: MILD
KOOS JR RAW SCORE: 2
TWISING OR PIVOTING ON KNEE: SEVERE
KOOS JR RAW SCORE: 15
KOOS JR RAW SCORE: 15
STRAIGHTENING KNEE FULLY: MILD
BENDING TO THE FLOOR TO PICK UP OBJECT: SEVERE

## 2024-12-16 ENCOUNTER — NON-APPOINTMENT (OUTPATIENT)
Age: 64
End: 2024-12-16

## 2025-07-02 ENCOUNTER — NON-APPOINTMENT (OUTPATIENT)
Age: 65
End: 2025-07-02

## 2025-07-08 ENCOUNTER — NON-APPOINTMENT (OUTPATIENT)
Age: 65
End: 2025-07-08